# Patient Record
Sex: FEMALE | Race: WHITE | NOT HISPANIC OR LATINO | Employment: FULL TIME | URBAN - METROPOLITAN AREA
[De-identification: names, ages, dates, MRNs, and addresses within clinical notes are randomized per-mention and may not be internally consistent; named-entity substitution may affect disease eponyms.]

---

## 2017-11-24 ENCOUNTER — OFFICE VISIT (OUTPATIENT)
Dept: URGENT CARE | Facility: CLINIC | Age: 37
End: 2017-11-24

## 2017-11-24 ENCOUNTER — APPOINTMENT (OUTPATIENT)
Dept: LAB | Facility: HOSPITAL | Age: 37
End: 2017-11-24

## 2017-11-24 DIAGNOSIS — R30.9 PAINFUL MICTURITION: ICD-10-CM

## 2017-11-24 PROCEDURE — 87086 URINE CULTURE/COLONY COUNT: CPT

## 2017-11-24 PROCEDURE — 99203 OFFICE O/P NEW LOW 30 MIN: CPT

## 2017-11-24 PROCEDURE — 87077 CULTURE AEROBIC IDENTIFY: CPT

## 2017-11-24 PROCEDURE — 87186 SC STD MICRODIL/AGAR DIL: CPT

## 2017-11-26 LAB — BACTERIA UR CULT: ABNORMAL

## 2017-12-05 NOTE — PROGRESS NOTES
Assessment    1  Urinary tract infection (599 0) (N39 0)    Plan  Painful urination    · (1) URINE CULTURE; Source:Urine, Clean Catch; Status:Active - Retrospective By  Protocol Authorization; Requested for:24Nov2017;   Urinary tract infection    · Nitrofurantoin Monohyd Macro 100 MG Oral Capsule; TAKE 1 CAPSULE TWICE  DAILY UNTIL GONE    Discussion/Summary  Discussion Summary:   1) take abx as prescribed  2) may c/w otc meds  3) will send urine cx- will call if any discrepancies with treatment are found  Chief Complaint    1  Urinary Frequency  Chief Complaint Free Text Note Form: Patient relates started with pain and burning with urination x5 days  Denies fever  Denies chills  Denies flank pain  Taking UTI OCT medication unsure of name  History of Present Illness  HPI: 31yo F p/w burning upon urination and abdominal discomfort x 5 days  Pt taking OTC cranberry supplement with improvement in symptoms  Pt denies n/v/d, back pain/flank pain, fever/chills  Hospital Based Practices Required Assessment:   Abuse And Domestic Violence Screen    Yes, the patient is safe at home  The patient states no one is hurting them  Depression And Suicide Screen  No, the patient has not had thoughts of hurting themself  No, the patient has not felt depressed in the past 7 days  Prefered Language is  english  Primary Language is  english  Urinary Frequency: Daira Hernandez presents with complaints of urinary frequency  Associated symptoms include dysuria, suprapubic pain and cloudy urine, but no urinary urgency, no urinary incontinence, no nocturia, no hematuria, no fever, no chills, no low back pain, no flank pain, no nausea, no vomiting, no diarrhea, no constipation, no abdominal distention, no peripheral edema, no menstrual change, no vaginal discharge, no vaginal itching, no weight loss and no anxiety        Review of Systems  Focused-Female:   Constitutional: No fever, no chills, feels well, no tiredness, no recent weight gain or loss  ENT: no ear ache, no loss of hearing, no nosebleeds or nasal discharge, no sore throat or hoarseness  Cardiovascular: no complaints of slow or fast heart rate, no chest pain, no palpitations, no leg claudication or lower extremity edema  Respiratory: no complaints of shortness of breath, no wheezing, no dyspnea on exertion, no orthopnea or PND  Breasts: no complaints of breast pain, breast lump or nipple discharge  Gastrointestinal: no complaints of abdominal pain, no constipation, no nausea or diarrhea, no vomiting, no bloody stools  Genitourinary: dysuria and pelvic pain, but no vaginal discharge, no incontinence, no dysmenorrhea and no unexplained vaginal bleeding  Musculoskeletal: no complaints of arthralgia, no myalgia, no joint swelling or stiffness, no limb pain or swelling  Integumentary: no complaints of skin rash or lesion, no itching or dry skin, no skin wounds  Neurological: no complaints of headache, no confusion, no numbness or tingling, no dizziness or fainting  ROS Reviewed:   ROS reviewed  Past Medical History    1  No pertinent past medical history  Active Problems And Past Medical History Reviewed: The active problems and past medical history were reviewed and updated today  Family History  Mother    1  Family history of Type 2 diabetes mellitus without complication  Father    2  Family history of Type 2 diabetes mellitus without complication  Family History Reviewed: The family history was reviewed and updated today  Social History    · Never smoker  Social History Reviewed: The social history was reviewed and is unchanged  Surgical History    1  Denied: History Of Prior Surgery  Surgical History Reviewed: The surgical history was reviewed and updated today  Current Meds   1  No Reported Medications Recorded  Medication List Reviewed: The medication list was reviewed and updated today  Allergies    1   No Known Drug Allergies    Vitals  Signs   Recorded: [id number] 10:12AM   Temperature: 100 2 F, Tympanic  Heart Rate: 67  Respiration: 16  Systolic: 793, RUE, Sitting  Diastolic: 81, RUE, Sitting  Height: 5 ft 3 in  Weight: 164 lb 4 oz  BMI Calculated: 29 1  BSA Calculated: 1 78  O2 Saturation: 100, RA  Pain Scale: 0    Physical Exam    Constitutional   General appearance: No acute distress, well appearing and well nourished  Pulmonary   Respiratory effort: No increased work of breathing or signs of respiratory distress  Auscultation of lungs: Clear to auscultation  Cardiovascular   Auscultation of heart: Normal rate and rhythm, normal S1 and S2, without murmurs  Abdomen   Abdomen: Abnormal   The abdomen was flat  Bowel sounds were normal  There was mild tenderness in the suprapubic area  no masses palpated  The abdomen was normal to percussion  no CVA tenderness   Liver and spleen: No hepatomegaly or splenomegaly  Skin   Skin and subcutaneous tissue: Normal without rashes or lesions      Psychiatric   Orientation to person, place, and time: Normal     Mood and affect: Normal        Signatures   Electronically signed by : SAVANNAH Dukes; Nov 24 2017 10:37AM EST                       (Author)

## 2018-01-21 ENCOUNTER — OFFICE VISIT (OUTPATIENT)
Dept: URGENT CARE | Facility: CLINIC | Age: 38
End: 2018-01-21
Payer: COMMERCIAL

## 2018-01-21 ENCOUNTER — GENERIC CONVERSION - ENCOUNTER (OUTPATIENT)
Dept: OTHER | Facility: OTHER | Age: 38
End: 2018-01-21

## 2018-01-21 LAB
ATRIAL RATE: 65 BPM
P AXIS: -19 DEGREES
PR INTERVAL: 116 MS
QRS AXIS: 51 DEGREES
QRSD INTERVAL: 90 MS
QT INTERVAL: 412 MS
QTC INTERVAL: 428 MS
T WAVE AXIS: 30 DEGREES
VENTRICULAR RATE: 65 BPM

## 2018-01-21 PROCEDURE — 93005 ELECTROCARDIOGRAM TRACING: CPT

## 2018-01-21 PROCEDURE — G0382 LEV 3 HOSP TYPE B ED VISIT: HCPCS
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
## 2018-02-24 ENCOUNTER — OFFICE VISIT (OUTPATIENT)
Dept: URGENT CARE | Facility: CLINIC | Age: 38
End: 2018-02-24
Payer: COMMERCIAL

## 2018-02-24 VITALS
BODY MASS INDEX: 28.35 KG/M2 | TEMPERATURE: 100.1 F | WEIGHT: 160 LBS | RESPIRATION RATE: 20 BRPM | DIASTOLIC BLOOD PRESSURE: 74 MMHG | OXYGEN SATURATION: 99 % | HEART RATE: 90 BPM | SYSTOLIC BLOOD PRESSURE: 139 MMHG | HEIGHT: 63 IN

## 2018-02-24 DIAGNOSIS — M79.89 FOOT SWELLING: Primary | ICD-10-CM

## 2018-02-24 PROCEDURE — G0381 LEV 2 HOSP TYPE B ED VISIT: HCPCS | Performed by: FAMILY MEDICINE

## 2018-02-24 NOTE — PATIENT INSTRUCTIONS
Suggest wearing support stockings  Elevate your feet as much as possible throughout the day  Follow up with her family doctor in the next few days for further evaluation and lab work regarding your swollen foot and ankle area    Go to emergency room if any severe increased swelling shortness of breath or calf pain

## 2018-02-24 NOTE — PROGRESS NOTES
[address] [place] [address] [address]     Patient Information: [address]  MRN: [id number] : 1980  Encounter: [id number]    HPI:  Patient presents with complaints of left foot swelling over the last 6 days  She denies any injury  She states it is worse in the morning when she awakens  She states she did fall and hurt her knee about a month ago  She had no problems up until 6 days ago  She also has noted a headache over the last few days  She denies any nausea or vomiting  She was unaware that she had a low-grade fever  She has not had any recent lab work done in years  She denies any associated shortness of breath or chest pain  She is a nonsmoker  Subjective:   Review of Systems   Constitutional: Negative  HENT: Negative for congestion, ear discharge, ear pain, hearing loss, rhinorrhea, sinus pain, sinus pressure, sore throat, tinnitus, trouble swallowing and voice change  Eyes: Negative  Respiratory: Negative  Cardiovascular: Negative  Gastrointestinal: Negative  Genitourinary: Negative  Musculoskeletal: Negative  Left foot and ankle swelling   Skin: Negative  Neurological: Positive for headaches  Hematological: Negative  Objective:  /74   Pulse 90   Temp 100 1 °F (37 8 °C)   Resp 20   Ht 5' 3" (1 6 m)   Wt 72 6 kg (160 lb)   SpO2 99%   BMI 28 34 kg/m²   Physical Exam   Constitutional: She is oriented to person, place, and time  She appears well-developed  HENT:   Head: Normocephalic  Right Ear: External ear normal    Left Ear: External ear normal    Mouth/Throat: Oropharynx is clear and moist    Eyes: EOM are normal  Pupils are equal, round, and reactive to light  Neck: Normal range of motion  Neck supple  No thyromegaly present  Cardiovascular: Normal rate, regular rhythm, normal heart sounds and intact distal pulses      Pulmonary/Chest: Effort normal and breath sounds normal    Abdominal: Soft  Bowel sounds are normal  There is no tenderness  Musculoskeletal: Normal range of motion  Neurological: She is alert and oriented to person, place, and time  She has normal reflexes  Skin: Skin is warm and dry  Left foot-there is slight pitting edema noted to the foot and lower leg  Negative Homans sign  There is a very fine petechial type rash over both anterior tibial areas  Nonblanching  Good pedal pulses are noted  No palpable deformity  Neurovascular exam is otherwise intact  Psychiatric: She has a normal mood and affect  Vitals reviewed  Assessment:  Diagnoses and all orders for this visit:    Foot swelling        Plan:  Patient Instructions   Suggest wearing support stockings  Elevate your feet as much as possible throughout the day  Follow up with her family doctor in the next few days for further evaluation and lab work regarding your swollen foot and ankle area  Go to emergency room if any severe increased swelling shortness of breath or calf pain       Rosemarie Leal DO  2/24/2018,12:02 PM    Portions of the record may have been created with voice recognition software   Occasional wrong word or "sound a like" substitutions may have occurred due to the inherent limitations of voice recognition software   Read the chart carefully and recognize, using context, where substitutions have occurred

## 2018-03-01 ENCOUNTER — TRANSCRIBE ORDERS (OUTPATIENT)
Dept: ADMINISTRATIVE | Facility: HOSPITAL | Age: 38
End: 2018-03-01

## 2018-03-01 ENCOUNTER — APPOINTMENT (OUTPATIENT)
Dept: LAB | Facility: CLINIC | Age: 38
End: 2018-03-01

## 2018-03-01 ENCOUNTER — OFFICE VISIT (OUTPATIENT)
Dept: FAMILY MEDICINE CLINIC | Facility: CLINIC | Age: 38
End: 2018-03-01

## 2018-03-01 VITALS
HEIGHT: 63 IN | DIASTOLIC BLOOD PRESSURE: 64 MMHG | WEIGHT: 162.8 LBS | SYSTOLIC BLOOD PRESSURE: 110 MMHG | BODY MASS INDEX: 28.84 KG/M2

## 2018-03-01 DIAGNOSIS — M25.40 JOINT SWELLING: ICD-10-CM

## 2018-03-01 DIAGNOSIS — R21 RASH: ICD-10-CM

## 2018-03-01 DIAGNOSIS — R53.83 FATIGUE, UNSPECIFIED TYPE: Primary | ICD-10-CM

## 2018-03-01 DIAGNOSIS — M25.475 SWELLING OF FOOT JOINT, LEFT: ICD-10-CM

## 2018-03-01 DIAGNOSIS — R53.83 FATIGUE, UNSPECIFIED TYPE: ICD-10-CM

## 2018-03-01 LAB
ALBUMIN SERPL BCP-MCNC: 3.5 G/DL (ref 3.5–5)
ALP SERPL-CCNC: 115 U/L (ref 46–116)
ALT SERPL W P-5'-P-CCNC: 82 U/L (ref 12–78)
ANION GAP SERPL CALCULATED.3IONS-SCNC: 6 MMOL/L (ref 4–13)
AST SERPL W P-5'-P-CCNC: 42 U/L (ref 5–45)
BASOPHILS # BLD AUTO: 0.06 THOUSANDS/ΜL (ref 0–0.1)
BASOPHILS NFR BLD AUTO: 1 % (ref 0–1)
BILIRUB SERPL-MCNC: 0.59 MG/DL (ref 0.2–1)
BUN SERPL-MCNC: 12 MG/DL (ref 5–25)
CALCIUM SERPL-MCNC: 8.6 MG/DL (ref 8.3–10.1)
CHLORIDE SERPL-SCNC: 108 MMOL/L (ref 100–108)
CO2 SERPL-SCNC: 26 MMOL/L (ref 21–32)
CREAT SERPL-MCNC: 0.82 MG/DL (ref 0.6–1.3)
EOSINOPHIL # BLD AUTO: 0.18 THOUSAND/ΜL (ref 0–0.61)
EOSINOPHIL NFR BLD AUTO: 4 % (ref 0–6)
ERYTHROCYTE [DISTWIDTH] IN BLOOD BY AUTOMATED COUNT: 13.8 % (ref 11.6–15.1)
GFR SERPL CREATININE-BSD FRML MDRD: 93 ML/MIN/1.73SQ M
GLUCOSE P FAST SERPL-MCNC: 86 MG/DL (ref 65–99)
HCT VFR BLD AUTO: 38 % (ref 34.8–46.1)
HGB BLD-MCNC: 12.1 G/DL (ref 11.5–15.4)
LYMPHOCYTES # BLD AUTO: 2.97 THOUSANDS/ΜL (ref 0.6–4.47)
LYMPHOCYTES NFR BLD AUTO: 60 % (ref 14–44)
MCH RBC QN AUTO: 26.8 PG (ref 26.8–34.3)
MCHC RBC AUTO-ENTMCNC: 31.8 G/DL (ref 31.4–37.4)
MCV RBC AUTO: 84 FL (ref 82–98)
MONOCYTES # BLD AUTO: 0.5 THOUSAND/ΜL (ref 0.17–1.22)
MONOCYTES NFR BLD AUTO: 10 % (ref 4–12)
NEUTROPHILS # BLD AUTO: 1.21 THOUSANDS/ΜL (ref 1.85–7.62)
NEUTS SEG NFR BLD AUTO: 25 % (ref 43–75)
NRBC BLD AUTO-RTO: 0 /100 WBCS
PLATELET # BLD AUTO: 242 THOUSANDS/UL (ref 149–390)
PMV BLD AUTO: 8.8 FL (ref 8.9–12.7)
POTASSIUM SERPL-SCNC: 4.1 MMOL/L (ref 3.5–5.3)
PROT SERPL-MCNC: 7.5 G/DL (ref 6.4–8.2)
RBC # BLD AUTO: 4.51 MILLION/UL (ref 3.81–5.12)
SODIUM SERPL-SCNC: 140 MMOL/L (ref 136–145)
WBC # BLD AUTO: 4.92 THOUSAND/UL (ref 4.31–10.16)

## 2018-03-01 PROCEDURE — 85025 COMPLETE CBC W/AUTO DIFF WBC: CPT

## 2018-03-01 PROCEDURE — 36415 COLL VENOUS BLD VENIPUNCTURE: CPT

## 2018-03-01 PROCEDURE — 80053 COMPREHEN METABOLIC PANEL: CPT

## 2018-03-01 PROCEDURE — 99203 OFFICE O/P NEW LOW 30 MIN: CPT | Performed by: NURSE PRACTITIONER

## 2018-03-01 RX ORDER — ALBUTEROL SULFATE 90 UG/1
AEROSOL, METERED RESPIRATORY (INHALATION)
COMMUNITY
Start: 2016-05-28 | End: 2018-10-29
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
## 2018-03-01 NOTE — LETTER
March 1, 2018     Patient: Althea Sesay   YOB: 1980   Date of Visit: 3/1/2018       To Whom it May Concern:    Althea Sesay is under my professional care  She was seen in my office on 3/1/2018  If you have any questions or concerns, please don't hesitate to call           Sincerely,          Priscilla Hard        CC: No Recipients

## 2018-03-02 ENCOUNTER — DOCUMENTATION (OUTPATIENT)
Dept: FAMILY MEDICINE CLINIC | Facility: CLINIC | Age: 38
End: 2018-03-02

## 2018-03-02 NOTE — PROGRESS NOTES
Blood work results received - no signs of infection, CMP within normal range also  Spoke to Dr Eveline Dunbar regarding symptoms, possible allergic exposure or inflammation  Will have her continue with hydrocortisone cream BID for the next two weeks to try and diminish the sx

## 2018-10-15 ENCOUNTER — OFFICE VISIT (OUTPATIENT)
Dept: URGENT CARE | Facility: CLINIC | Age: 38
End: 2018-10-15
Payer: COMMERCIAL

## 2018-10-15 VITALS
HEIGHT: 63 IN | OXYGEN SATURATION: 97 % | BODY MASS INDEX: 28.7 KG/M2 | WEIGHT: 162 LBS | TEMPERATURE: 99 F | RESPIRATION RATE: 18 BRPM | DIASTOLIC BLOOD PRESSURE: 77 MMHG | SYSTOLIC BLOOD PRESSURE: 117 MMHG | HEART RATE: 80 BPM

## 2018-10-15 DIAGNOSIS — N39.0 URINARY TRACT INFECTION WITHOUT HEMATURIA, SITE UNSPECIFIED: Primary | ICD-10-CM

## 2018-10-15 PROCEDURE — S9083 URGENT CARE CENTER GLOBAL: HCPCS | Performed by: PHYSICIAN ASSISTANT

## 2018-10-15 PROCEDURE — G0382 LEV 3 HOSP TYPE B ED VISIT: HCPCS | Performed by: PHYSICIAN ASSISTANT

## 2018-10-15 PROCEDURE — 87086 URINE CULTURE/COLONY COUNT: CPT | Performed by: PHYSICIAN ASSISTANT

## 2018-10-15 RX ORDER — NITROFURANTOIN 25; 75 MG/1; MG/1
100 CAPSULE ORAL 2 TIMES DAILY
Qty: 14 CAPSULE | Refills: 0 | Status: SHIPPED | OUTPATIENT
Start: 2018-10-15 | End: 2018-10-22

## 2018-10-15 NOTE — PROGRESS NOTES
[facility]        NAME: Carl Reagan is a 30 y o  female  : 1980    MRN: [id number]  DATE: October 15, 2018  TIME: 5:21 PM    Assessment and Plan   Urinary tract infection without hematuria, site unspecified [N39 0]  1  Urinary tract infection without hematuria, site unspecified  Urine culture    nitrofurantoin (MACROBID) 100 mg capsule    CANCELED: Urine culture         Patient Instructions       Follow up with PCP in 3-5 days  Proceed to  ER if symptoms worsen  Chief Complaint     Chief Complaint   Patient presents with    Possible UTI     urgency and burning since Thursday         History of Present Illness       Urinary Tract Infection    This is a new problem  Episode onset: 5 days ago  The problem occurs every urination  The problem has been gradually worsening  The pain is moderate  There has been no fever  There is no history of pyelonephritis  Associated symptoms include frequency and urgency  Pertinent negatives include no chills, discharge, flank pain, hematuria, hesitancy, nausea, possible pregnancy, sweats or vomiting  She has tried nothing for the symptoms  The treatment provided moderate relief  There is no history of catheterization, kidney stones, recurrent UTIs, a single kidney, urinary stasis or a urological procedure  Review of Systems   Review of Systems   Constitutional: Negative for chills  Gastrointestinal: Negative for nausea and vomiting  Genitourinary: Positive for dysuria, frequency and urgency  Negative for difficulty urinating, dyspareunia, enuresis, flank pain, hematuria and hesitancy           Current Medications       Current Outpatient Prescriptions:     albuterol (PROVENTIL HFA,VENTOLIN HFA) 90 mcg/act inhaler, 2 puffs po q 4-6 hours prn coughing, wheezing or shortness of breath, Disp: , Rfl:     nitrofurantoin (MACROBID) 100 mg capsule, Take 1 capsule (100 mg total) by mouth 2 (two) times a day for 7 days, Disp: 14 capsule, Rfl: 0    Current Allergies     Allergies as of 10/15/2018    (No Known Allergies)            The following portions of the patient's history were reviewed and updated as appropriate: allergies, current medications, past family history, past medical history, past social history, past surgical history and problem list      History reviewed  No pertinent past medical history  History reviewed  No pertinent surgical history  Family History   Problem Relation Age of Onset    Diabetes type II Mother         without complication     Diabetes type II Father         without complication          Medications have been verified  Objective   /77   Pulse 80   Temp 99 °F (37 2 °C) (Tympanic)   Resp 18   Ht 5' 3" (1 6 m)   Wt 73 5 kg (162 lb)   SpO2 97%   BMI 28 70 kg/m²        Physical Exam     Physical Exam   Constitutional: She appears well-developed and well-nourished  Cardiovascular: Normal rate, regular rhythm, normal heart sounds and intact distal pulses  Exam reveals no gallop and no friction rub  No murmur heard  Pulmonary/Chest: Effort normal and breath sounds normal  No respiratory distress  She has no wheezes  She has no rales  Abdominal: Soft  Bowel sounds are normal  She exhibits no distension  There is tenderness in the suprapubic area  There is no rebound, no guarding and no CVA tenderness

## 2018-10-15 NOTE — PATIENT INSTRUCTIONS

## 2018-10-16 LAB — BACTERIA UR CULT: ABNORMAL

## 2018-10-22 NOTE — PROGRESS NOTES
Still taking Macrobid  Per Silvia patient can stop meds  She is feeling better   Will call back if symptoms come back

## 2018-10-22 NOTE — PROGRESS NOTES
Can we please call Mamta and ask how she is feeling since taking the antibiotic? I know they prescribed Macrobid when she was seen at Urgent Care  Thanks

## 2018-10-29 ENCOUNTER — OFFICE VISIT (OUTPATIENT)
Dept: FAMILY MEDICINE CLINIC | Facility: CLINIC | Age: 38
End: 2018-10-29
Payer: COMMERCIAL

## 2018-10-29 VITALS
HEART RATE: 88 BPM | WEIGHT: 157 LBS | OXYGEN SATURATION: 98 % | DIASTOLIC BLOOD PRESSURE: 70 MMHG | BODY MASS INDEX: 27.82 KG/M2 | SYSTOLIC BLOOD PRESSURE: 116 MMHG | HEIGHT: 63 IN

## 2018-10-29 DIAGNOSIS — R53.83 FATIGUE, UNSPECIFIED TYPE: ICD-10-CM

## 2018-10-29 DIAGNOSIS — F41.9 ANXIETY: ICD-10-CM

## 2018-10-29 DIAGNOSIS — Z23 NEEDS FLU SHOT: Primary | ICD-10-CM

## 2018-10-29 PROBLEM — M25.40 JOINT SWELLING: Status: RESOLVED | Noted: 2018-03-01 | Resolved: 2018-10-29

## 2018-10-29 PROBLEM — R21 RASH: Status: RESOLVED | Noted: 2018-03-01 | Resolved: 2018-10-29

## 2018-10-29 PROBLEM — M25.475 SWELLING OF FOOT JOINT, LEFT: Status: RESOLVED | Noted: 2018-03-01 | Resolved: 2018-10-29

## 2018-10-29 PROCEDURE — 99395 PREV VISIT EST AGE 18-39: CPT | Performed by: INTERNAL MEDICINE

## 2018-10-29 PROCEDURE — 99213 OFFICE O/P EST LOW 20 MIN: CPT | Performed by: INTERNAL MEDICINE

## 2018-10-29 PROCEDURE — 3008F BODY MASS INDEX DOCD: CPT | Performed by: INTERNAL MEDICINE

## 2018-10-29 RX ORDER — SERTRALINE HYDROCHLORIDE 25 MG/1
25 TABLET, FILM COATED ORAL DAILY
Qty: 30 TABLET | Refills: 3 | Status: SHIPPED | OUTPATIENT
Start: 2018-10-29 | End: 2018-12-11 | Stop reason: SDUPTHER
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
## 2018-10-29 NOTE — ASSESSMENT & PLAN NOTE
Mild   Will give a trial of sertraline 25 mg daily  We discussed the possibility of GI distress, sexual dysfunction, and slight weight gain on this medication  This will be more likely on higher doses  Since she does have some obsessive symptoms, this may also this as well  We will reassess in 6 weeks

## 2018-10-29 NOTE — PATIENT INSTRUCTIONS
Anxiety  Mild  Will give a trial of sertraline 25 mg daily  Fatigue  Will check CBC, iron panel, and thyroid function

## 2018-11-01 ENCOUNTER — TRANSCRIBE ORDERS (OUTPATIENT)
Dept: ADMINISTRATIVE | Facility: HOSPITAL | Age: 38
End: 2018-11-01

## 2018-11-01 ENCOUNTER — APPOINTMENT (OUTPATIENT)
Dept: LAB | Facility: CLINIC | Age: 38
End: 2018-11-01
Payer: COMMERCIAL

## 2018-11-01 DIAGNOSIS — R53.83 FATIGUE, UNSPECIFIED TYPE: ICD-10-CM

## 2018-11-01 LAB
ANION GAP SERPL CALCULATED.3IONS-SCNC: 7 MMOL/L (ref 4–13)
BUN SERPL-MCNC: 9 MG/DL (ref 5–25)
CALCIUM SERPL-MCNC: 8.7 MG/DL (ref 8.3–10.1)
CHLORIDE SERPL-SCNC: 107 MMOL/L (ref 100–108)
CO2 SERPL-SCNC: 23 MMOL/L (ref 21–32)
CREAT SERPL-MCNC: 0.75 MG/DL (ref 0.6–1.3)
ERYTHROCYTE [DISTWIDTH] IN BLOOD BY AUTOMATED COUNT: 12.9 % (ref 11.6–15.1)
FERRITIN SERPL-MCNC: 7 NG/ML (ref 8–388)
GFR SERPL CREATININE-BSD FRML MDRD: 103 ML/MIN/1.73SQ M
GLUCOSE P FAST SERPL-MCNC: 84 MG/DL (ref 65–99)
HCT VFR BLD AUTO: 38.7 % (ref 34.8–46.1)
HGB BLD-MCNC: 12.3 G/DL (ref 11.5–15.4)
IRON SATN MFR SERPL: 19 %
IRON SERPL-MCNC: 80 UG/DL (ref 50–170)
MCH RBC QN AUTO: 28.4 PG (ref 26.8–34.3)
MCHC RBC AUTO-ENTMCNC: 31.8 G/DL (ref 31.4–37.4)
MCV RBC AUTO: 89 FL (ref 82–98)
PLATELET # BLD AUTO: 232 THOUSANDS/UL (ref 149–390)
PMV BLD AUTO: 10.1 FL (ref 8.9–12.7)
POTASSIUM SERPL-SCNC: 3.8 MMOL/L (ref 3.5–5.3)
RBC # BLD AUTO: 4.33 MILLION/UL (ref 3.81–5.12)
SODIUM SERPL-SCNC: 137 MMOL/L (ref 136–145)
TIBC SERPL-MCNC: 421 UG/DL (ref 250–450)
TSH SERPL DL<=0.05 MIU/L-ACNC: 0.99 UIU/ML (ref 0.36–3.74)
WBC # BLD AUTO: 3.96 THOUSAND/UL (ref 4.31–10.16)

## 2018-11-01 PROCEDURE — 82728 ASSAY OF FERRITIN: CPT

## 2018-11-01 PROCEDURE — 36415 COLL VENOUS BLD VENIPUNCTURE: CPT

## 2018-11-01 PROCEDURE — 84443 ASSAY THYROID STIM HORMONE: CPT

## 2018-11-01 PROCEDURE — 85027 COMPLETE CBC AUTOMATED: CPT

## 2018-11-01 PROCEDURE — 80048 BASIC METABOLIC PNL TOTAL CA: CPT

## 2018-11-01 PROCEDURE — 83550 IRON BINDING TEST: CPT

## 2018-11-01 PROCEDURE — 83540 ASSAY OF IRON: CPT

## 2018-11-02 ENCOUNTER — TELEPHONE (OUTPATIENT)
Dept: FAMILY MEDICINE CLINIC | Facility: CLINIC | Age: 38
End: 2018-11-02

## 2018-11-02 DIAGNOSIS — E61.1 DIETARY IRON DEFICIENCY WITHOUT ANEMIA: Primary | ICD-10-CM

## 2018-11-02 RX ORDER — FERROUS SULFATE 325(65) MG
325 TABLET ORAL EVERY OTHER DAY
Refills: 0 | COMMUNITY
Start: 2018-11-02 | End: 2019-12-19

## 2018-11-02 NOTE — TELEPHONE ENCOUNTER
I called and left her a generic voicemail about calling back to get her lab results or signing up for [employer] so she conceived the labs on her own  The only result of significance is that her ferritin (iron level) is very low  I would recommend that she start on over-the-counter her iron in the form of ferrous sulfate 65 mg 1 tablet every other day  Note, the white blood cell count of 3 96 is not significant  Appointment on 11/01/2018   Component Date Value Ref Range Status    WBC 11/01/2018 3 96* 4 31 - 10 16 Thousand/uL Final    RBC 11/01/2018 4 33  3 81 - 5 12 Million/uL Final    Hemoglobin 11/01/2018 12 3  11 5 - 15 4 g/dL Final    Hematocrit 11/01/2018 38 7  34 8 - 46 1 % Final    MCV 11/01/2018 89  82 - 98 fL Final    MCH 11/01/2018 28 4  26 8 - 34 3 pg Final    MCHC 11/01/2018 31 8  31 4 - 37 4 g/dL Final    RDW 11/01/2018 12 9  11 6 - 15 1 % Final    Platelets 63/66/1190 232  149 - 390 Thousands/uL Final    MPV 11/01/2018 10 1  8 9 - 12 7 fL Final    Sodium 11/01/2018 137  136 - 145 mmol/L Final    Potassium 11/01/2018 3 8  3 5 - 5 3 mmol/L Final    Chloride 11/01/2018 107  100 - 108 mmol/L Final    CO2 11/01/2018 23  21 - 32 mmol/L Final    ANION GAP 11/01/2018 7  4 - 13 mmol/L Final    BUN 11/01/2018 9  5 - 25 mg/dL Final    Creatinine 11/01/2018 0 75  0 60 - 1 30 mg/dL Final    Standardized to IDMS reference method    Glucose, Fasting 11/01/2018 84  65 - 99 mg/dL Final      Specimen collection should occur prior to Sulfasalazine administration due to the potential for falsely depressed results  Specimen collection should occur prior to Sulfapyridine administration due to the potential for falsely elevated results      Calcium 11/01/2018 8 7  8 3 - 10 1 mg/dL Final    eGFR 11/01/2018 103  ml/min/1 73sq m Final    TSH 3RD GENERATON 11/01/2018 0 992  0 358 - 3 740 uIU/mL Final    Using supplements with high doses of biotin 20 to more than 300 times greater than the adequate daily intake for adults of 30 mcg/day as established by the [place] [employer] [facility], can cause falsely depress results   Iron Saturation 11/01/2018 19  % Final    TIBC 11/01/2018 421  250 - 450 ug/dL Final    Iron 11/01/2018 80  50 - 170 ug/dL Final      Patients treated with metal-binding drugs (ie  Deferoxamine) may have depressed iron values      Ferritin 11/01/2018 7* 8 - 388 ng/mL Final

## 2018-12-11 ENCOUNTER — OFFICE VISIT (OUTPATIENT)
Dept: FAMILY MEDICINE CLINIC | Facility: CLINIC | Age: 38
End: 2018-12-11
Payer: COMMERCIAL

## 2018-12-11 VITALS
OXYGEN SATURATION: 99 % | HEART RATE: 63 BPM | HEIGHT: 63 IN | SYSTOLIC BLOOD PRESSURE: 120 MMHG | WEIGHT: 152.8 LBS | BODY MASS INDEX: 27.07 KG/M2 | DIASTOLIC BLOOD PRESSURE: 70 MMHG

## 2018-12-11 DIAGNOSIS — Z01.419 ENCOUNTER FOR CERVICAL PAP SMEAR WITH PELVIC EXAM: Primary | ICD-10-CM

## 2018-12-11 DIAGNOSIS — F41.9 ANXIETY: ICD-10-CM

## 2018-12-11 DIAGNOSIS — F42.9 OBSESSIVE-COMPULSIVE DISORDER, UNSPECIFIED TYPE: ICD-10-CM

## 2018-12-11 PROCEDURE — 1036F TOBACCO NON-USER: CPT | Performed by: INTERNAL MEDICINE

## 2018-12-11 PROCEDURE — 99213 OFFICE O/P EST LOW 20 MIN: CPT | Performed by: INTERNAL MEDICINE

## 2018-12-11 PROCEDURE — 3008F BODY MASS INDEX DOCD: CPT | Performed by: INTERNAL MEDICINE

## 2018-12-11 RX ORDER — SERTRALINE HYDROCHLORIDE 25 MG/1
25 TABLET, FILM COATED ORAL EVERY OTHER DAY
Qty: 30 TABLET | Refills: 0 | COMMUNITY
Start: 2018-12-11 | End: 2019-06-25

## 2018-12-11 RX ORDER — BUSPIRONE HYDROCHLORIDE 7.5 MG/1
7.5 TABLET ORAL 3 TIMES DAILY
Qty: 60 TABLET | Refills: 5 | Status: SHIPPED | OUTPATIENT
Start: 2018-12-11 | End: 2019-06-25

## 2018-12-11 NOTE — ASSESSMENT & PLAN NOTE
Because of side effects, we will taper off sertraline  Take 25 mg every other day for 1 week and then discontinue  Will start buspirone (also known as BuSpar) 7 5 mg twice a day

## 2018-12-11 NOTE — LETTER
December 11, 2018     Patient: Puja Summers   YOB: 1980   Date of Visit: 12/11/2018       To Whom it May Concern:    Puja Summers is under my professional care  She was seen in my office on 12/11/2018  She may return to work on 12/12/2018  If you have any questions or concerns, please don't hesitate to call           Sincerely,          Merritt Richardosn MD        CC: No Recipients

## 2018-12-11 NOTE — PROGRESS NOTES
Assessment/Plan:    Problem List Items Addressed This Visit        Other    Anxiety     Because of side effects, we will taper off sertraline  Take 25 mg every other day for 1 week and then discontinue  Will start buspirone (also known as BuSpar) 7 5 mg twice a day  Relevant Medications    sertraline (ZOLOFT) 25 mg tablet    busPIRone (BUSPAR) 7 5 mg tablet    OCD (obsessive compulsive disorder)     SSRIs are the drugs that have been most effective for OCD  However, they all tend to produce some degree of sexual dysfunction  We discussed the possibility of referral for CBT  She wants to hold off on this for now  Relevant Medications    sertraline (ZOLOFT) 25 mg tablet    busPIRone (BUSPAR) 7 5 mg tablet      Other Visit Diagnoses     Encounter for cervical Pap smear with pelvic exam    -  Primary    Relevant Orders    Ambulatory referral to Gynecology          Subjective:     Patient ID: Dayanara Rodrigues is a 41 y o  female  Here for follow-up after starting on sertraline 25 mg daily for anxiety and OCD  She feels that sertraline has really helped her anxiety  She usually has difficulty getting anxious on Sunday nights before she has to go back to work Monday morning  Also, extra work with 10 to stress her out as well  Both of these things have actually improved  However, she has been having some difficulty with decreased libido and delayed orgasm  There has been no improvement in her OCD at this dose  Objective:    /70 (BP Location: Left arm, Patient Position: Sitting, Cuff Size: Standard)   Pulse 63   Ht 5' 3" (1 6 m)   Wt 69 3 kg (152 lb 12 8 oz)   SpO2 99%   Breastfeeding? No   BMI 27 07 kg/m²       Physical Exam    General:  Well-developed, well-nourished, in no acute distress  Exam otherwise deferred today

## 2018-12-11 NOTE — PATIENT INSTRUCTIONS
Anxiety  Because of side effects, we will taper off sertraline  Take 25 mg every other day for 1 week and then discontinue  Will start buspirone (also known as BuSpar) 7 5 mg twice a day

## 2018-12-11 NOTE — ASSESSMENT & PLAN NOTE
SSRIs are the drugs that have been most effective for OCD  However, they all tend to produce some degree of sexual dysfunction  We discussed the possibility of referral for CBT  She wants to hold off on this for now

## 2019-06-25 ENCOUNTER — OFFICE VISIT (OUTPATIENT)
Dept: FAMILY MEDICINE CLINIC | Facility: CLINIC | Age: 39
End: 2019-06-25
Payer: COMMERCIAL

## 2019-06-25 VITALS
OXYGEN SATURATION: 99 % | WEIGHT: 164.2 LBS | DIASTOLIC BLOOD PRESSURE: 70 MMHG | HEIGHT: 63 IN | HEART RATE: 66 BPM | SYSTOLIC BLOOD PRESSURE: 116 MMHG | BODY MASS INDEX: 29.09 KG/M2

## 2019-06-25 DIAGNOSIS — L30.9 ECZEMA OF LEFT HAND: Primary | ICD-10-CM

## 2019-06-25 DIAGNOSIS — Z12.4 PAP SMEAR FOR CERVICAL CANCER SCREENING: ICD-10-CM

## 2019-06-25 PROCEDURE — 3008F BODY MASS INDEX DOCD: CPT | Performed by: INTERNAL MEDICINE

## 2019-06-25 PROCEDURE — 99213 OFFICE O/P EST LOW 20 MIN: CPT | Performed by: INTERNAL MEDICINE

## 2019-06-25 PROCEDURE — 1036F TOBACCO NON-USER: CPT | Performed by: INTERNAL MEDICINE

## 2019-06-25 RX ORDER — BETAMETHASONE DIPROPIONATE 0.5 MG/G
CREAM TOPICAL 2 TIMES DAILY
Qty: 30 G | Refills: 1 | Status: SHIPPED | OUTPATIENT
Start: 2019-06-25 | End: 2019-12-19

## 2019-07-31 ENCOUNTER — OFFICE VISIT (OUTPATIENT)
Dept: OBGYN CLINIC | Facility: MEDICAL CENTER | Age: 39
End: 2019-07-31
Payer: COMMERCIAL

## 2019-07-31 VITALS
SYSTOLIC BLOOD PRESSURE: 100 MMHG | HEIGHT: 62 IN | DIASTOLIC BLOOD PRESSURE: 70 MMHG | WEIGHT: 167 LBS | BODY MASS INDEX: 30.73 KG/M2

## 2019-07-31 DIAGNOSIS — Z01.419 ENCOUNTER FOR WELL WOMAN EXAM WITH ROUTINE GYNECOLOGICAL EXAM: Primary | ICD-10-CM

## 2019-07-31 DIAGNOSIS — N63.10 LUMP OF RIGHT BREAST: ICD-10-CM

## 2019-07-31 PROCEDURE — S0610 ANNUAL GYNECOLOGICAL EXAMINA: HCPCS | Performed by: OBSTETRICS & GYNECOLOGY

## 2019-07-31 NOTE — PROGRESS NOTES
ASSESSMENT & PLAN: Nilton Milian is a 41 y o  No obstetric history on file  with normal gynecologic exam     1   Routine well woman exam done today  2  Pap and HPV:  The patient's last pap and hpv was unsure  It was normal     Pap and cotesting was done today  Current ASCCP Guidelines reviewed  3   The following were reviewed in today's visit: breast self exam   4  Right breast lump - check ultrasound and mammo    CC:  Annual Gynecologic Examination    HPI: Nilton Milian is a 41 y o  No obstetric history on file  who presents for annual gynecologic examination  She has the following concerns:  No GYN complaints; recently ; states cycles are regular  Menarche age 10    Health Maintenance:    She wears her seatbelt routinely  She does not perform regular monthly self breast exams  She feels safe at home  History reviewed  No pertinent past medical history  History reviewed  No pertinent surgical history  Past OB/Gyn History:  OB History    None       Pt does not have menstrual issues  History of sexually transmitted infection: No   History of abnormal pap smears: No      Patient is currently sexually active  heterosexual   The current method of family planning is none      Family History   Problem Relation Age of Onset    Diabetes type II Mother         without complication     Diabetes type II Father         without complication     Breast cancer Paternal Grandmother        Social History:  Social History     Socioeconomic History    Marital status: /Civil Union     Spouse name: Not on file    Number of children: Not on file    Years of education: Not on file    Highest education level: Not on file   Occupational History    Not on file   Social Needs    Financial resource strain: Not on file    Food insecurity:     Worry: Not on file     Inability: Not on file    Transportation needs:     Medical: Not on file     Non-medical: Not on file   Tobacco Use    Smoking status: Never Smoker    Smokeless tobacco: Never Used   Substance and Sexual Activity    Alcohol use: Yes     Frequency: Monthly or less     Drinks per session: 1 or 2     Binge frequency: Never    Drug use: Never    Sexual activity: Yes     Partners: Male     Birth control/protection: None   Lifestyle    Physical activity:     Days per week: Not on file     Minutes per session: Not on file    Stress: Not on file   Relationships    Social connections:     Talks on phone: Not on file     Gets together: Not on file     Attends Restorationism service: Not on file     Active member of club or organization: Not on file     Attends meetings of clubs or organizations: Not on file     Relationship status: Not on file    Intimate partner violence:     Fear of current or ex partner: Not on file     Emotionally abused: Not on file     Physically abused: Not on file     Forced sexual activity: Not on file   Other Topics Concern    Not on file   Social History Narrative    Not on file     Presently lives with   Patient is   Patient is currently employed [employer]    No Known Allergies      Current Outpatient Medications:     betamethasone dipropionate (DIPROSONE) 0 05 % cream, Apply topically 2 (two) times a day, Disp: 30 g, Rfl: 1    ferrous sulfate 325 (65 Fe) mg tablet, Take 1 tablet (325 mg total) by mouth every other day (Patient not taking: Reported on 6/25/2019), Disp: , Rfl: 0      Review of Systems  Constitutional :no fever, feels well, no tiredness, no recent weight gain or loss  ENT: no ear ache, no loss of hearing, no nosebleeds or nasal discharge, no sore throat or hoarseness  Cardiovascular: no complaints of slow or fast heart beat, no chest pain, no palpitations, no leg claudication or lower extremity edema    Respiratory: no complaints of shortness of shortness of breath, no CLEMENT  Breasts:no complaints of breast pain, breast lump, or nipple discharge  Gastrointestinal: no complaints of abdominal pain, constipation, nausea, vomiting, or diarrhea or bloody stools  Genitourinary : no complaints of dysuria, incontinence, pelvic pain, no dysmenorrhea, vaginal discharge or abnormal vaginal bleeding and as noted in HPI  Musculoskeletal: no complaints of arthralgia, no myalgia, no joint swelling or stiffness, no limb pain or swelling  Integumentary: no complaints of skin rash or lesion, itching or dry skin  Neurological: no complaints of headache, no confusion, no numbness or tingling, no dizziness or fainting    Objective      /70   Ht 5' 2" (1 575 m)   Wt 75 8 kg (167 lb)   LMP  (LMP Unknown)   Breastfeeding? No   BMI 30 54 kg/m²   General:   appears stated age, cooperative, alert normal mood and affect   Neck: normal, supple,trachea midline, no masses   Heart: regular rate and rhythm, S1, S2 normal, no murmur, click, rub or gallop   Lungs: clear to auscultation bilaterally   Breasts: normal appearance, no masses or tenderness, Inspection negative, No nipple retraction or dimpling, No nipple discharge or bleeding, abnormal findings: 2 cm, smooth, mobile and non-tender nodule located on the right 12:00   Abdomen: soft, non-tender, without masses or organomegaly   Vulva: normal, normal female genitalia, Bartholin's, Urethra, Okay normal, no lesions, normal female hair distribution   Vagina: normal vagina, no discharge, exudate, lesion, or erythema   Urethra: normal   Cervix: Normal, no discharge  PAP done  Uterus: normal size, contour, position, consistency, mobility, non-tender   Adnexa: normal adnexa and no mass, fullness, tenderness   Lymphatic palpation of lymph nodes in neck, axilla, groin and/or other locations: no lymphadenopathy or masses noted   Skin normal skin turgor and no rashes     Psychiatric orientation to person, place, and time: normal  mood and affect: normal

## 2019-07-31 NOTE — PATIENT INSTRUCTIONS
Thank you for your confidence in our team    We appreciate you and welcome your feedback  If you receive a survey from us, please take a few moments to let us know how we are doing     Sincerely,   Indigo Uriarte MD

## 2019-07-31 NOTE — LETTER
July 31, 2019     Patient: Carmita Caruso   YOB: 1980   Date of Visit: 7/31/2019       To Whom it May Concern:    Carmita Caruso is under my professional care  She was seen in my office on 7/31/2019  She may return to work on 8/1/19  If you have any questions or concerns, please don't hesitate to call           Sincerely,          Chantel Centeno MD

## 2019-08-07 LAB
CLINICAL INFO: NORMAL
CYTO CVX: NORMAL
CYTOLOGY CMNT CVX/VAG CYTO-IMP: NORMAL
DATE PREVIOUS BX: NORMAL
HPV E6+E7 MRNA CVX QL NAA+PROBE: NOT DETECTED
LMP START DATE: NORMAL
SL AMB PREV. PAP:: NORMAL
SPECIMEN SOURCE CVX/VAG CYTO: NORMAL

## 2019-08-09 ENCOUNTER — HOSPITAL ENCOUNTER (OUTPATIENT)
Dept: ULTRASOUND IMAGING | Facility: CLINIC | Age: 39
Discharge: HOME | End: 2019-08-09
Payer: COMMERCIAL

## 2019-08-09 ENCOUNTER — HOSPITAL ENCOUNTER (OUTPATIENT)
Dept: MAMMOGRAPHY | Facility: CLINIC | Age: 39
Discharge: HOME | End: 2019-08-09
Payer: COMMERCIAL

## 2019-08-09 VITALS — HEIGHT: 62 IN | WEIGHT: 167 LBS | BODY MASS INDEX: 30.73 KG/M2

## 2019-08-09 DIAGNOSIS — N63.10 LUMP OF RIGHT BREAST: ICD-10-CM

## 2019-08-09 DIAGNOSIS — R92.8 ABNORMAL MAMMOGRAM: ICD-10-CM

## 2019-08-09 PROCEDURE — 76642 ULTRASOUND BREAST LIMITED: CPT

## 2019-08-09 PROCEDURE — 77066 DX MAMMO INCL CAD BI: CPT

## 2019-08-09 PROCEDURE — G0279 TOMOSYNTHESIS, MAMMO: HCPCS

## 2019-11-27 DIAGNOSIS — L30.9 ECZEMA OF LEFT HAND: ICD-10-CM

## 2019-11-29 RX ORDER — BETAMETHASONE DIPROPIONATE 0.5 MG/G
CREAM TOPICAL
Qty: 30 G | Refills: 1 | OUTPATIENT
Start: 2019-11-29

## 2019-12-02 ENCOUNTER — OFFICE VISIT (OUTPATIENT)
Dept: FAMILY MEDICINE CLINIC | Facility: CLINIC | Age: 39
End: 2019-12-02
Payer: COMMERCIAL

## 2019-12-02 VITALS
DIASTOLIC BLOOD PRESSURE: 72 MMHG | WEIGHT: 176.15 LBS | HEART RATE: 70 BPM | SYSTOLIC BLOOD PRESSURE: 109 MMHG | OXYGEN SATURATION: 98 % | HEIGHT: 62 IN | BODY MASS INDEX: 32.42 KG/M2

## 2019-12-02 DIAGNOSIS — N30.00 ACUTE CYSTITIS WITHOUT HEMATURIA: Primary | ICD-10-CM

## 2019-12-02 LAB
BACTERIA UR QL AUTO: ABNORMAL /HPF
BILIRUB UR QL STRIP: NEGATIVE
CAOX CRY URNS QL MICRO: ABNORMAL /HPF
CLARITY UR: ABNORMAL
COLOR UR: YELLOW
GLUCOSE UR STRIP-MCNC: NEGATIVE MG/DL
HGB UR QL STRIP.AUTO: ABNORMAL
KETONES UR STRIP-MCNC: NEGATIVE MG/DL
LEUKOCYTE ESTERASE UR QL STRIP: ABNORMAL
NITRITE UR QL STRIP: NEGATIVE
NON-SQ EPI CELLS URNS QL MICRO: ABNORMAL /HPF
PH UR STRIP.AUTO: 5.5 [PH]
PROT UR STRIP-MCNC: NEGATIVE MG/DL
RBC #/AREA URNS AUTO: ABNORMAL /HPF
SL AMB  POCT GLUCOSE, UA: NORMAL
SL AMB LEUKOCYTE ESTERASE,UA: ABNORMAL
SL AMB POCT BILIRUBIN,UA: NORMAL
SL AMB POCT BLOOD,UA: ABNORMAL
SL AMB POCT CLARITY,UA: CLEAR
SL AMB POCT COLOR,UA: ABNORMAL
SL AMB POCT KETONES,UA: ABNORMAL
SL AMB POCT NITRITE,UA: ABNORMAL
SL AMB POCT PH,UA: 5
SL AMB POCT SPECIFIC GRAVITY,UA: 1.02
SL AMB POCT URINE PROTEIN: NORMAL
SL AMB POCT UROBILINOGEN: NORMAL
SP GR UR STRIP.AUTO: 1.02 (ref 1–1.03)
UROBILINOGEN UR QL STRIP.AUTO: 0.2 E.U./DL
WBC #/AREA URNS AUTO: ABNORMAL /HPF

## 2019-12-02 PROCEDURE — 87086 URINE CULTURE/COLONY COUNT: CPT | Performed by: INTERNAL MEDICINE

## 2019-12-02 PROCEDURE — 87186 SC STD MICRODIL/AGAR DIL: CPT | Performed by: INTERNAL MEDICINE

## 2019-12-02 PROCEDURE — 99213 OFFICE O/P EST LOW 20 MIN: CPT | Performed by: INTERNAL MEDICINE

## 2019-12-02 PROCEDURE — 81002 URINALYSIS NONAUTO W/O SCOPE: CPT | Performed by: INTERNAL MEDICINE

## 2019-12-02 PROCEDURE — 87077 CULTURE AEROBIC IDENTIFY: CPT | Performed by: INTERNAL MEDICINE

## 2019-12-02 PROCEDURE — 3008F BODY MASS INDEX DOCD: CPT | Performed by: INTERNAL MEDICINE

## 2019-12-02 PROCEDURE — 81001 URINALYSIS AUTO W/SCOPE: CPT | Performed by: INTERNAL MEDICINE

## 2019-12-02 RX ORDER — NITROFURANTOIN 25; 75 MG/1; MG/1
100 CAPSULE ORAL 2 TIMES DAILY
Qty: 10 CAPSULE | Refills: 0 | Status: SHIPPED | OUTPATIENT
Start: 2019-12-02 | End: 2019-12-07

## 2019-12-02 NOTE — PATIENT INSTRUCTIONS
Problem List Items Addressed This Visit     None      Visit Diagnoses     Acute cystitis without hematuria    -  Primary    Recommend Macrodantin 100 mg twice a day for 5 days and forcing fluids      Relevant Medications    nitrofurantoin (MACROBID) 100 mg capsule    Other Relevant Orders    UA w Reflex to Microscopic w Reflex to Culture

## 2019-12-02 NOTE — PROGRESS NOTES
Assessment/Plan:    Problem List Items Addressed This Visit     None      Visit Diagnoses     Acute cystitis without hematuria    -  Primary    Recommend Macrodantin 100 mg twice a day for 5 days and forcing fluids  Relevant Medications    nitrofurantoin (MACROBID) 100 mg capsule    Other Relevant Orders    UA w Reflex to Microscopic w Reflex to Culture          Chief Complaint     Possible UTI          Patient ID: Lowell Dey is a 42 y o  female reports increased urinary frequency and some burning for the past 4 days  No fever, chills, sweats, or hematuria  LMP 12/28/19  She has had UTIs in the past        Objective:    /72 (BP Location: Right arm, Patient Position: Sitting, Cuff Size: Standard)   Pulse 70   Ht 5' 2" (1 575 m)   Wt 79 9 kg (176 lb 2 4 oz)   SpO2 98%   BMI 32 22 kg/m²     Wt Readings from Last 3 Encounters:   12/02/19 79 9 kg (176 lb 2 4 oz)   08/09/19 75 8 kg (167 lb)   07/31/19 75 8 kg (167 lb)         Physical Exam    General:  Well-developed, well-nourished, in no acute distress  Abdomen:  Soft, nontender, normoactive bowel sounds, no palpable masses, no hepatosplenomegaly

## 2019-12-04 ENCOUNTER — TELEPHONE (OUTPATIENT)
Dept: FAMILY MEDICINE CLINIC | Facility: CLINIC | Age: 39
End: 2019-12-04

## 2019-12-04 LAB
BACTERIA UR CULT: ABNORMAL
BACTERIA UR CULT: ABNORMAL

## 2019-12-04 NOTE — TELEPHONE ENCOUNTER
Please contact Mamta and let her know that her urine culture did confirm that she had a urinary tract infection  The bacteria is sensitive to Macrodantin which is the drug that we prescribed for her  Also, [facility]

## 2019-12-19 ENCOUNTER — OFFICE VISIT (OUTPATIENT)
Dept: FAMILY MEDICINE CLINIC | Facility: CLINIC | Age: 39
End: 2019-12-19
Payer: COMMERCIAL

## 2019-12-19 VITALS
HEART RATE: 74 BPM | OXYGEN SATURATION: 98 % | BODY MASS INDEX: 32.13 KG/M2 | HEIGHT: 62 IN | WEIGHT: 174.6 LBS | SYSTOLIC BLOOD PRESSURE: 110 MMHG | DIASTOLIC BLOOD PRESSURE: 74 MMHG

## 2019-12-19 DIAGNOSIS — Z23 NEEDS FLU SHOT: Primary | ICD-10-CM

## 2019-12-19 DIAGNOSIS — E04.9 GOITER: ICD-10-CM

## 2019-12-19 DIAGNOSIS — Z00.00 ANNUAL PHYSICAL EXAM: ICD-10-CM

## 2019-12-19 DIAGNOSIS — L30.9 ECZEMA OF LEFT HAND: ICD-10-CM

## 2019-12-19 DIAGNOSIS — Z13.220 ENCOUNTER FOR SCREENING FOR LIPID DISORDER: ICD-10-CM

## 2019-12-19 DIAGNOSIS — Z11.4 ENCOUNTER FOR SCREENING FOR HIV: ICD-10-CM

## 2019-12-19 PROCEDURE — 99395 PREV VISIT EST AGE 18-39: CPT | Performed by: INTERNAL MEDICINE

## 2019-12-19 RX ORDER — BETAMETHASONE DIPROPIONATE 0.5 MG/G
CREAM TOPICAL 2 TIMES DAILY PRN
Qty: 30 G | Refills: 1 | COMMUNITY
Start: 2019-12-19 | End: 2020-02-10 | Stop reason: SDUPTHER

## 2019-12-19 NOTE — PATIENT INSTRUCTIONS
Get fasting blood work in the next 1-2 weeks  Focus on eating a plant based diet  To learn about how to lose weight on a plant-based diet, go to www  forksoverknives com  There is good evidence that eating a plant-based diet can have dramatic effects on overall health, weight, cholesterol, diabetes and other health issues  Please review this website https://[employer]/  com/ and read some of the success stories of people who have adopted a plant-based diet  Wellness Visit for Adults   AMBULATORY CARE:   A wellness visit  is when you see your healthcare provider to get screened for health problems  You can also get advice on how to stay healthy  Write down your questions so you remember to ask them  Ask your healthcare provider how often you should have a wellness visit  What happens at a wellness visit:  Your healthcare provider will ask about your health, and your family history of health problems  This includes high blood pressure, heart disease, and cancer  He or she will ask if you have symptoms that concern you, if you smoke, and about your mood  You may also be asked about your intake of medicines, supplements, food, and alcohol  Any of the following may be done:  · Your weight  will be checked  Your height may also be checked so your body mass index (BMI) can be calculated  Your BMI shows if you are at a healthy weight  · Your blood pressure  and heart rate will be checked  Your temperature may also be checked  · Blood and urine tests  may be done  Blood tests may be done to check your cholesterol levels  Abnormal cholesterol levels increase your risk for heart disease and stroke  You may also need a blood or urine test to check for diabetes if you are at increased risk  Urine tests may be done to look for signs of an infection or kidney disease  · A physical exam  includes checking your heartbeat and lungs with a stethoscope   Your healthcare provider may also check your skin to look for sun damage  · Screening tests  may be recommended  A screening test is done to check for diseases that may not cause symptoms  The screening tests you may need depend on your age, gender, family history, and lifestyle habits  For example, colorectal screening may be recommended if you are 48years old or older  Screening tests you need if you are a woman:   · A Pap smear  is used to screen for cervical cancer  Pap smears are usually done every 3 to 5 years depending on your age  You may need them more often if you have had abnormal Pap smear test results in the past  Ask your healthcare provider how often you should have a Pap smear  · A mammogram  is an x-ray of your breasts to screen for breast cancer  Experts recommend mammograms every 2 years starting at age 48 years  You may need a mammogram at age 52 years or younger if you have an increased risk for breast cancer  Talk to your healthcare provider about when you should start having mammograms and how often you need them  Vaccines you may need:   · Get an influenza vaccine  every year  The influenza vaccine protects you from the flu  Several types of viruses cause the flu  The viruses change over time, so new vaccines are made each year  · Get a tetanus-diphtheria (Td) booster vaccine  every 10 years  This vaccine protects you against tetanus and diphtheria  Tetanus is a severe infection that may cause painful muscle spasms and lockjaw  Diphtheria is a severe bacterial infection that causes a thick covering in the back of your mouth and throat  · Get a human papillomavirus (HPV) vaccine  if you are female and aged 23 to 32 or male 23 to 24 and never received it  This vaccine protects you from HPV infection  HPV is the most common infection spread by sexual contact  HPV may also cause vaginal, penile, and anal cancers  · Get a pneumococcal vaccine  if you are aged 72 years or older   The pneumococcal vaccine is an injection given to protect you from pneumococcal disease  Pneumococcal disease is an infection caused by pneumococcal bacteria  The infection may cause pneumonia, meningitis, or an ear infection  · Get a shingles vaccine  if you are aged 61 or older, even if you have had shingles before  The shingles vaccine is an injection to protect you from the varicella-zoster virus  This is the same virus that causes chickenpox  Shingles is a painful rash that develops in people who had chickenpox or have been exposed to the virus  How to eat healthy:  My Plate is a model for planning healthy meals  It shows the types and amounts of foods that should go on your plate  Fruits and vegetables make up about half of your plate, and grains and protein make up the other half  A serving of dairy is included on the side of your plate  The amount of calories and serving sizes you need depends on your age, gender, weight, and height  Examples of healthy foods are listed below:  · Eat a variety of vegetables  such as dark green, red, and orange vegetables  You can also include canned vegetables low in sodium (salt) and frozen vegetables without added butter or sauces  · Eat a variety of fresh fruits , canned fruit in 100% juice, frozen fruit, and dried fruit  · Include whole grains  At least half of the grains you eat should be whole grains  Examples include whole-wheat bread, wheat pasta, brown rice, and whole-grain cereals such as oatmeal     · Eat a variety of protein foods such as seafood (fish and shellfish), lean meat, and poultry without skin (turkey and chicken)  Examples of lean meats include pork leg, shoulder, or tenderloin, and beef round, sirloin, tenderloin, and extra lean ground beef  Other protein foods include eggs and egg substitutes, beans, peas, soy products, nuts, and seeds  · Choose low-fat dairy products such as skim or 1% milk or low-fat yogurt, cheese, and cottage cheese       · Limit unhealthy fats  such as butter, hard margarine, and shortening  Exercise:  Exercise at least 30 minutes per day on most days of the week  Some examples of exercise include walking, biking, dancing, and swimming  You can also fit in more physical activity by taking the stairs instead of the elevator or parking farther away from stores  Include muscle strengthening activities 2 days each week  Regular exercise provides many health benefits  It helps you manage your weight, and decreases your risk for type 2 diabetes, heart disease, stroke, and high blood pressure  Exercise can also help improve your mood  Ask your healthcare provider about the best exercise plan for you  General health and safety guidelines:   · Do not smoke  Nicotine and other chemicals in cigarettes and cigars can cause lung damage  Ask your healthcare provider for information if you currently smoke and need help to quit  E-cigarettes or smokeless tobacco still contain nicotine  Talk to your healthcare provider before you use these products  · Limit alcohol  A drink of alcohol is 12 ounces of beer, 5 ounces of wine, or 1½ ounces of liquor  · Lose weight, if needed  Being overweight increases your risk of certain health conditions  These include heart disease, high blood pressure, type 2 diabetes, and certain types of cancer  · Protect your skin  Do not sunbathe or use tanning beds  Use sunscreen with a SPF 15 or higher  Apply sunscreen at least 15 minutes before you go outside  Reapply sunscreen every 2 hours  Wear protective clothing, hats, and sunglasses when you are outside  · Drive safely  Always wear your seatbelt  Make sure everyone in your car wears a seatbelt  A seatbelt can save your life if you are in an accident  Do not use your cell phone when you are driving  This could distract you and cause an accident  Pull over if you need to make a call or send a text message  · Practice safe sex    Use latex condoms if are sexually active and have more than one partner  Your healthcare provider may recommend screening tests for sexually transmitted infections (STIs)  · Wear helmets, lifejackets, and protective gear  Always wear a helmet when you ride a bike or motorcycle, go skiing, or play sports that could cause a head injury  Wear protective equipment when you play sports  Wear a lifejacket when you are on a boat or doing water sports  © 2017 [facility] Information is for End User's use only and may not be sold, redistributed or otherwise used for commercial purposes  All illustrations and images included in CareNotes® are the copyrighted property of A D A M , Inc  or Rip Rodriguez  The above information is an  only  It is not intended as medical advice for individual conditions or treatments  Talk to your doctor, nurse or pharmacist before following any medical regimen to see if it is safe and effective for you

## 2019-12-19 NOTE — LETTER
December 19, 2019     Patient: Landon Acosta   YOB: 1980   Date of Visit: 12/19/2019       To Whom it May Concern:    Landon Acosta is under my professional care  She was seen in my office on 12/19/2019  If you have any questions or concerns, please don't hesitate to call           Sincerely,          Jessi Sena MD

## 2019-12-23 ENCOUNTER — APPOINTMENT (OUTPATIENT)
Dept: LAB | Facility: CLINIC | Age: 39
End: 2019-12-23
Payer: COMMERCIAL

## 2019-12-23 ENCOUNTER — TRANSCRIBE ORDERS (OUTPATIENT)
Dept: ADMINISTRATIVE | Facility: HOSPITAL | Age: 39
End: 2019-12-23

## 2019-12-23 DIAGNOSIS — Z11.4 ENCOUNTER FOR SCREENING FOR HIV: ICD-10-CM

## 2019-12-23 DIAGNOSIS — E04.9 GOITER: ICD-10-CM

## 2019-12-23 DIAGNOSIS — Z13.220 ENCOUNTER FOR SCREENING FOR LIPID DISORDER: ICD-10-CM

## 2019-12-23 LAB
CHOLEST SERPL-MCNC: 164 MG/DL (ref 50–200)
HDLC SERPL-MCNC: 61 MG/DL
LDLC SERPL CALC-MCNC: 93 MG/DL (ref 0–100)
NONHDLC SERPL-MCNC: 103 MG/DL
TRIGL SERPL-MCNC: 48 MG/DL
TSH SERPL DL<=0.05 MIU/L-ACNC: 1.85 UIU/ML (ref 0.36–3.74)

## 2019-12-23 PROCEDURE — 36415 COLL VENOUS BLD VENIPUNCTURE: CPT

## 2019-12-23 PROCEDURE — 84443 ASSAY THYROID STIM HORMONE: CPT

## 2019-12-23 PROCEDURE — 80061 LIPID PANEL: CPT

## 2019-12-23 PROCEDURE — 87389 HIV-1 AG W/HIV-1&-2 AB AG IA: CPT

## 2019-12-24 ENCOUNTER — TELEPHONE (OUTPATIENT)
Dept: FAMILY MEDICINE CLINIC | Facility: CLINIC | Age: 39
End: 2019-12-24

## 2019-12-25 LAB — HIV 1+2 AB+HIV1 P24 AG SERPL QL IA: NORMAL

## 2020-02-10 ENCOUNTER — OFFICE VISIT (OUTPATIENT)
Dept: FAMILY MEDICINE CLINIC | Facility: CLINIC | Age: 40
End: 2020-02-10
Payer: COMMERCIAL

## 2020-02-10 VITALS
SYSTOLIC BLOOD PRESSURE: 122 MMHG | WEIGHT: 178.13 LBS | BODY MASS INDEX: 32.78 KG/M2 | HEIGHT: 62 IN | OXYGEN SATURATION: 97 % | DIASTOLIC BLOOD PRESSURE: 78 MMHG | HEART RATE: 65 BPM

## 2020-02-10 DIAGNOSIS — Z23 NEEDS FLU SHOT: Primary | ICD-10-CM

## 2020-02-10 DIAGNOSIS — L30.9 ECZEMA OF LEFT HAND: ICD-10-CM

## 2020-02-10 PROCEDURE — 99213 OFFICE O/P EST LOW 20 MIN: CPT | Performed by: INTERNAL MEDICINE

## 2020-02-10 PROCEDURE — 1036F TOBACCO NON-USER: CPT | Performed by: INTERNAL MEDICINE

## 2020-02-10 PROCEDURE — 3008F BODY MASS INDEX DOCD: CPT | Performed by: INTERNAL MEDICINE

## 2020-02-10 RX ORDER — BETAMETHASONE DIPROPIONATE 0.5 MG/G
CREAM TOPICAL 2 TIMES DAILY PRN
Qty: 45 G | Refills: 1 | Status: SHIPPED | OUTPATIENT
Start: 2020-02-10 | End: 2020-12-28

## 2020-02-10 NOTE — PROGRESS NOTES
Assessment/Plan:    Problem List Items Addressed This Visit     None      Visit Diagnoses     Needs flu shot    -  Primary    Relevant Orders    influenza vaccine, 0067-1931, quadrivalent, 0 5 mL, preservative-free, for adult and pediatric patients 6 mos+ (AFLURIA, FLUARIX, FLULAVAL, FLUZONE)    Eczema of left hand        Would apply betamethasone to the rash on the wrist and hands twice a day for 7-14 days  Relevant Medications    betamethasone dipropionate (DIPROSONE) 0 05 % cream      Reviewed her labs  She has an excellent lipid panel, TSH was normal, HIV was negative  Chief Complaint     Follow-up          Patient ID: Sarah Escobar is a 42 y o  female here for follow-up of hand eczema and to review lab results  The rash on her right hand resolved after using the betamethasone for couple of weeks  However, the rash on the dorsum of the left hand has improved but did not resolve completely  Objective:    /78 (BP Location: Left arm, Patient Position: Sitting, Cuff Size: Large)   Pulse 65   Ht 5' 2" (1 575 m)   Wt 80 8 kg (178 lb 2 1 oz)   SpO2 97%   BMI 32 58 kg/m²     Wt Readings from Last 3 Encounters:   02/10/20 80 8 kg (178 lb 2 1 oz)   12/19/19 79 2 kg (174 lb 9 7 oz)   12/02/19 79 9 kg (176 lb 2 4 oz)         Physical Exam    General:  Well-developed, well-nourished, in no acute distress    Skin:  Linear 2 cm patch of eczema on at the extensor crease of the left wrist

## 2020-02-10 NOTE — PATIENT INSTRUCTIONS
Will continue betamethasone cream twice a day to the left hand for another 3-4 weeks  If the rash resolves, can just come back as needed  Otherwise, schedule appointment

## 2020-05-26 ENCOUNTER — OFFICE VISIT (OUTPATIENT)
Dept: FAMILY MEDICINE CLINIC | Facility: CLINIC | Age: 40
End: 2020-05-26
Payer: COMMERCIAL

## 2020-05-26 VITALS
WEIGHT: 176.2 LBS | HEIGHT: 62 IN | SYSTOLIC BLOOD PRESSURE: 116 MMHG | TEMPERATURE: 97.6 F | OXYGEN SATURATION: 97 % | HEART RATE: 85 BPM | BODY MASS INDEX: 32.42 KG/M2 | DIASTOLIC BLOOD PRESSURE: 68 MMHG

## 2020-05-26 DIAGNOSIS — G43.009 MIGRAINE WITHOUT AURA AND WITHOUT STATUS MIGRAINOSUS, NOT INTRACTABLE: Primary | ICD-10-CM

## 2020-05-26 PROCEDURE — 99213 OFFICE O/P EST LOW 20 MIN: CPT | Performed by: INTERNAL MEDICINE

## 2020-05-26 PROCEDURE — 3008F BODY MASS INDEX DOCD: CPT | Performed by: INTERNAL MEDICINE

## 2020-05-26 PROCEDURE — 1036F TOBACCO NON-USER: CPT | Performed by: INTERNAL MEDICINE

## 2020-05-26 RX ORDER — SUMATRIPTAN 50 MG/1
50 TABLET, FILM COATED ORAL ONCE AS NEEDED
Qty: 9 TABLET | Refills: 3 | Status: SHIPPED | OUTPATIENT
Start: 2020-05-26 | End: 2020-12-28

## 2020-06-01 ENCOUNTER — TELEPHONE (OUTPATIENT)
Dept: FAMILY MEDICINE CLINIC | Facility: CLINIC | Age: 40
End: 2020-06-01

## 2020-06-01 DIAGNOSIS — G43.009 MIGRAINE WITHOUT AURA AND WITHOUT STATUS MIGRAINOSUS, NOT INTRACTABLE: Primary | ICD-10-CM

## 2020-06-02 RX ORDER — INDOMETHACIN 50 MG/1
50 CAPSULE ORAL
Qty: 15 CAPSULE | Refills: 0 | Status: SHIPPED | OUTPATIENT
Start: 2020-06-02 | End: 2022-07-19

## 2020-12-21 ENCOUNTER — OFFICE VISIT (OUTPATIENT)
Dept: URGENT CARE | Facility: CLINIC | Age: 40
End: 2020-12-21
Payer: COMMERCIAL

## 2020-12-21 VITALS
RESPIRATION RATE: 16 BRPM | HEIGHT: 62 IN | HEART RATE: 76 BPM | BODY MASS INDEX: 31.28 KG/M2 | OXYGEN SATURATION: 99 % | WEIGHT: 170 LBS | TEMPERATURE: 98.7 F

## 2020-12-21 DIAGNOSIS — Z20.822 ENCOUNTER FOR LABORATORY TESTING FOR COVID-19 VIRUS: ICD-10-CM

## 2020-12-21 DIAGNOSIS — R68.89 FLU-LIKE SYMPTOMS: Primary | ICD-10-CM

## 2020-12-21 PROCEDURE — 99212 OFFICE O/P EST SF 10 MIN: CPT | Performed by: EMERGENCY MEDICINE

## 2020-12-21 PROCEDURE — U0003 INFECTIOUS AGENT DETECTION BY NUCLEIC ACID (DNA OR RNA); SEVERE ACUTE RESPIRATORY SYNDROME CORONAVIRUS 2 (SARS-COV-2) (CORONAVIRUS DISEASE [COVID-19]), AMPLIFIED PROBE TECHNIQUE, MAKING USE OF HIGH THROUGHPUT TECHNOLOGIES AS DESCRIBED BY CMS-2020-01-R: HCPCS | Performed by: EMERGENCY MEDICINE

## 2020-12-23 LAB — SARS-COV-2 RNA SPEC QL NAA+PROBE: DETECTED

## 2020-12-28 ENCOUNTER — TELEMEDICINE (OUTPATIENT)
Dept: FAMILY MEDICINE CLINIC | Facility: CLINIC | Age: 40
End: 2020-12-28
Payer: COMMERCIAL

## 2020-12-28 DIAGNOSIS — U07.1 2019 NOVEL CORONAVIRUS DISEASE (COVID-19): Primary | ICD-10-CM

## 2020-12-28 PROCEDURE — 99213 OFFICE O/P EST LOW 20 MIN: CPT | Performed by: INTERNAL MEDICINE

## 2020-12-28 PROCEDURE — 1036F TOBACCO NON-USER: CPT | Performed by: INTERNAL MEDICINE

## 2020-12-30 ENCOUNTER — PATIENT MESSAGE (OUTPATIENT)
Dept: FAMILY MEDICINE CLINIC | Facility: CLINIC | Age: 40
End: 2020-12-30

## 2021-04-14 ENCOUNTER — TELEMEDICINE (OUTPATIENT)
Dept: FAMILY MEDICINE CLINIC | Facility: CLINIC | Age: 41
End: 2021-04-14
Payer: COMMERCIAL

## 2021-04-14 VITALS — HEIGHT: 62 IN | BODY MASS INDEX: 31.28 KG/M2 | WEIGHT: 170 LBS

## 2021-04-14 DIAGNOSIS — R10.84 GENERALIZED ABDOMINAL PAIN: ICD-10-CM

## 2021-04-14 DIAGNOSIS — R19.7 DIARRHEA, UNSPECIFIED TYPE: ICD-10-CM

## 2021-04-14 DIAGNOSIS — R11.0 NAUSEA: Primary | ICD-10-CM

## 2021-04-14 PROCEDURE — 1036F TOBACCO NON-USER: CPT | Performed by: NURSE PRACTITIONER

## 2021-04-14 PROCEDURE — 99213 OFFICE O/P EST LOW 20 MIN: CPT | Performed by: NURSE PRACTITIONER

## 2021-04-14 PROCEDURE — 3008F BODY MASS INDEX DOCD: CPT | Performed by: NURSE PRACTITIONER

## 2021-04-14 RX ORDER — ONDANSETRON 4 MG/1
4 TABLET, ORALLY DISINTEGRATING ORAL EVERY 6 HOURS PRN
Qty: 20 TABLET | Refills: 0 | Status: SHIPPED | OUTPATIENT
Start: 2021-04-14 | End: 2022-07-19

## 2021-04-14 RX ORDER — DICYCLOMINE HYDROCHLORIDE 10 MG/1
10 CAPSULE ORAL
Qty: 30 CAPSULE | Refills: 0 | Status: SHIPPED | OUTPATIENT
Start: 2021-04-14 | End: 2022-07-19

## 2021-04-14 NOTE — PROGRESS NOTES
Virtual Regular Visit      Assessment/Plan:    Problem List Items Addressed This Visit     None      Visit Diagnoses     Nausea    -  Primary    Relevant Medications    ondansetron (ZOFRAN-ODT) 4 mg disintegrating tablet    dicyclomine (BENTYL) 10 mg capsule    Diarrhea, unspecified type        Relevant Medications    ondansetron (ZOFRAN-ODT) 4 mg disintegrating tablet    dicyclomine (BENTYL) 10 mg capsule    Generalized abdominal pain        Relevant Medications    ondansetron (ZOFRAN-ODT) 4 mg disintegrating tablet    dicyclomine (BENTYL) 10 mg capsule           Suspect food poisoning, will treat symptoms, to make us aware if no improvement in 24 to 48 hours  Reason for visit is   Chief Complaint   Patient presents with    food poisoning    Virtual Regular Visit        Encounter provider SOLEDAD Allen    Provider located at [address] [place], [place] [address]  [address] [place] [id number]      Recent Visits  No visits were found meeting these conditions  Showing recent visits within past 7 days and meeting all other requirements     Today's Visits  Date Type Provider Dept   04/14/21 Telemedicine [facility]   Showing today's visits and meeting all other requirements     Future Appointments  No visits were found meeting these conditions  Showing future appointments within next 150 days and meeting all other requirements        The patient was identified by name and date of birth  Tonia Johnson was informed that this is a telemedicine visit and that the visit is being conducted through [address] [place] [address] Now and patient was informed that this is a secure, HIPAA-compliant platform  She agrees to proceed     My office door was closed  No one else was in the room  She acknowledged consent and understanding of privacy and security of the video platform   The patient has agreed to participate and understands they can discontinue the visit at any time  Patient is aware this is a billable service  Jock Mortimer is a 47 y o  female       Onset of nausea, diarrhea and abdominal pain after eating at a restaurant -  with similar symptoms  Past Medical History:   Diagnosis Date    Known health problems: none        Past Surgical History:   Procedure Laterality Date    NO PAST SURGERIES         Current Outpatient Medications   Medication Sig Dispense Refill    dicyclomine (BENTYL) 10 mg capsule Take 1 capsule (10 mg total) by mouth 4 (four) times a day (before meals and at bedtime) 30 capsule 0    indomethacin (INDOCIN) 50 mg capsule Take 1 capsule (50 mg total) by mouth 3 (three) times a day with meals for 5 days 15 capsule 0    ondansetron (ZOFRAN-ODT) 4 mg disintegrating tablet Take 1 tablet (4 mg total) by mouth every 6 (six) hours as needed for nausea or vomiting 20 tablet 0     No current facility-administered medications for this visit  No Known Allergies    Review of Systems   Constitutional: Negative  Respiratory: Negative  Cardiovascular: Negative  Gastrointestinal: Positive for abdominal pain, diarrhea and nausea  Video Exam    Vitals:    04/14/21 1713   Weight: 77 1 kg (170 lb)   Height: 5' 2" (1 575 m)       Physical Exam  Pulmonary:      Effort: Pulmonary effort is normal    Neurological:      Mental Status: She is alert and oriented to person, place, and time  I spent 6 minutes directly with the patient during this visit      Deepalidl Lou acknowledges that she has consented to an online visit or consultation  She understands that the online visit is based solely on information provided by her, and that, in the absence of a face-to-face physical evaluation by the physician, the diagnosis she receives is both limited and provisional in terms of accuracy and completeness   This is not intended to replace a full medical face-to-face evaluation by the physician  Zeyad Barraza understands and accepts these terms

## 2021-04-14 NOTE — LETTER
April 14, 2021     Patient: Bobby Alonso   YOB: 1980   Date of Visit: 4/14/2021       To Whom it May Concern:    Bobby Alonso is under my professional care  She was seen by me on 4/14/2021  Please excuse her from work from 04/14/2021 to 04/16/0021  If you have any questions or concerns, please don't hesitate to call           Sincerely,          Carolyn Benson

## 2021-07-13 ENCOUNTER — OFFICE VISIT (OUTPATIENT)
Dept: URGENT CARE | Facility: CLINIC | Age: 41
End: 2021-07-13
Payer: COMMERCIAL

## 2021-07-13 VITALS
SYSTOLIC BLOOD PRESSURE: 133 MMHG | WEIGHT: 170 LBS | HEIGHT: 62 IN | HEART RATE: 70 BPM | DIASTOLIC BLOOD PRESSURE: 79 MMHG | RESPIRATION RATE: 16 BRPM | TEMPERATURE: 98.7 F | BODY MASS INDEX: 31.28 KG/M2 | OXYGEN SATURATION: 100 %

## 2021-07-13 DIAGNOSIS — N30.01 ACUTE CYSTITIS WITH HEMATURIA: Primary | ICD-10-CM

## 2021-07-13 LAB
SL AMB  POCT GLUCOSE, UA: NEGATIVE
SL AMB LEUKOCYTE ESTERASE,UA: ABNORMAL
SL AMB POCT BILIRUBIN,UA: NEGATIVE
SL AMB POCT BLOOD,UA: ABNORMAL
SL AMB POCT CLARITY,UA: CLEAR
SL AMB POCT COLOR,UA: YELLOW
SL AMB POCT KETONES,UA: ABNORMAL
SL AMB POCT NITRITE,UA: NEGATIVE
SL AMB POCT PH,UA: 7
SL AMB POCT SPECIFIC GRAVITY,UA: 1.02
SL AMB POCT URINE PROTEIN: NEGATIVE
SL AMB POCT UROBILINOGEN: NEGATIVE

## 2021-07-13 PROCEDURE — 87186 SC STD MICRODIL/AGAR DIL: CPT | Performed by: PHYSICIAN ASSISTANT

## 2021-07-13 PROCEDURE — 87086 URINE CULTURE/COLONY COUNT: CPT | Performed by: PHYSICIAN ASSISTANT

## 2021-07-13 PROCEDURE — 87077 CULTURE AEROBIC IDENTIFY: CPT | Performed by: PHYSICIAN ASSISTANT

## 2021-07-13 PROCEDURE — 99213 OFFICE O/P EST LOW 20 MIN: CPT | Performed by: PHYSICIAN ASSISTANT

## 2021-07-13 PROCEDURE — 81002 URINALYSIS NONAUTO W/O SCOPE: CPT | Performed by: PHYSICIAN ASSISTANT

## 2021-07-13 RX ORDER — PHENAZOPYRIDINE HYDROCHLORIDE 200 MG/1
200 TABLET, FILM COATED ORAL
Qty: 10 TABLET | Refills: 0 | Status: SHIPPED | OUTPATIENT
Start: 2021-07-13 | End: 2022-07-19

## 2021-07-13 RX ORDER — CEPHALEXIN 500 MG/1
500 CAPSULE ORAL EVERY 12 HOURS SCHEDULED
Qty: 6 CAPSULE | Refills: 0 | Status: SHIPPED | OUTPATIENT
Start: 2021-07-13 | End: 2021-07-16

## 2021-07-13 NOTE — PATIENT INSTRUCTIONS
Take antibiotic as prescribed  Complete full dose of antibiotics even if symptoms begin to improve or resolve  May use OTC Tylenol for fever  DRINK LOTS OF FLUIDS  Observe for signs of worsening infection including increased pain, discharge, blood in the urine, back or flank pain, fever or chills, or persistent symptoms  Your symptoms should begin to improve over the next couple days  Follow-up with your PCP in 3-5 days if symptoms worsen or do not improve  Go to ER if symptoms become severe  Urinary Tract Infection in Women   WHAT YOU NEED TO KNOW:   A urinary tract infection (UTI) is caused by bacteria that get inside your urinary tract  Most bacteria that enter your urinary tract come out when you urinate  If the bacteria stay in your urinary tract, you may get an infection  Your urinary tract includes your kidneys, ureters, bladder, and urethra  Urine is made in your kidneys, and it flows from the ureters to the bladder  Urine leaves the bladder through the urethra  A UTI is more common in your lower urinary tract, which includes your bladder and urethra  DISCHARGE INSTRUCTIONS:   Return to the emergency department if:   · You are urinating very little or not at all  · You have a high fever with shaking chills  · You have side or back pain that gets worse  Call your doctor if:   · You have a fever  · You do not feel better after 2 days of taking antibiotics  · You are vomiting  · You have questions or concerns about your condition or care  Medicines:   · Antibiotics  help fight a bacterial infection  If you have UTIs often (called recurrent UTIs), you may be given antibiotics to take regularly  You will be given directions for when and how to use antibiotics  The goal is to prevent UTIs but not cause antibiotic resistance by using antibiotics too often  · Medicines  may be given to decrease pain and burning when you urinate   They will also help decrease the feeling that you need to urinate often  These medicines will make your urine orange or red  · Take your medicine as directed  Contact your healthcare provider if you think your medicine is not helping or if you have side effects  Tell him or her if you are allergic to any medicine  Keep a list of the medicines, vitamins, and herbs you take  Include the amounts, and when and why you take them  Bring the list or the pill bottles to follow-up visits  Carry your medicine list with you in case of an emergency  Follow up with your healthcare provider as directed:  Write down your questions so you remember to ask them during your visits  Prevent another UTI:   · Empty your bladder often  Urinate and empty your bladder as soon as you feel the need  Do not hold your urine for long periods of time  · Wipe from front to back after you urinate or have a bowel movement  This will help prevent germs from getting into your urinary tract through your urethra  · Drink liquids as directed  Ask how much liquid to drink each day and which liquids are best for you  You may need to drink more liquids than usual to help flush out the bacteria  Do not drink alcohol, caffeine, or citrus juices  These can irritate your bladder and increase your symptoms  Your healthcare provider may recommend cranberry juice to help prevent a UTI  · Urinate after you have sex  This can help flush out bacteria passed during sex  · Do not douche or use feminine deodorants  These can change the chemical balance in your vagina  · Change sanitary pads or tampons often  This will help prevent germs from getting into your urinary tract  · Talk to your healthcare provider about your birth control method  You may need to change your method if it is increasing your risk for UTIs  · Wear cotton underwear and clothes that are loose  Tight pants and nylon underwear can trap moisture and cause bacteria to grow      · Vaginal estrogen may be recommended  This medicine helps prevent UTIs in women who have gone through menopause or are in torie-menopause  · Do pelvic muscle exercises often  Pelvic muscle exercises may help you start and stop urinating  Strong pelvic muscles may help you empty your bladder easier  Squeeze these muscles tightly for 5 seconds like you are trying to hold back urine  Then relax for 5 seconds  Gradually work up to squeezing for 10 seconds  Do 3 sets of 15 repetitions a day, or as directed  © Copyright [facility] [address] Information is for End User's use only and may not be sold, redistributed or otherwise used for commercial purposes  All illustrations and images included in CareNotes® are the copyrighted property of A D A M , Inc  or [address] [place] [address]  The above information is an  only  It is not intended as medical advice for individual conditions or treatments  Talk to your doctor, nurse or pharmacist before following any medical regimen to see if it is safe and effective for you

## 2021-07-13 NOTE — PROGRESS NOTES
[facility]        NAME: Az Marie is a 47 y o  female  : 1980    MRN: [id number]  DATE: 2021  TIME: 5:47 PM    Assessment and Plan   Acute cystitis with hematuria [N30 01]  1  Acute cystitis with hematuria  Urine culture    POCT urine dip    cephalexin (KEFLEX) 500 mg capsule    phenazopyridine (PYRIDIUM) 200 mg tablet    CANCELED: POCT blood glucose         Patient Instructions   Take antibiotic as prescribed  Complete full dose of antibiotics even if symptoms begin to improve or resolve  May use OTC Tylenol for fever  DRINK LOTS OF FLUIDS  Observe for signs of worsening infection including increased pain, discharge, blood in the urine, back or flank pain, fever or chills, or persistent symptoms  Your symptoms should begin to improve over the next couple days  Follow-up with your PCP in 3-5 days if symptoms worsen or do not improve  Go to ER if symptoms become severe  Follow up with PCP in 3-5 days  Proceed to  ER if symptoms worsen  Chief Complaint     Chief Complaint   Patient presents with    Possible UTI     pain and burning upon urination         History of Present Illness        Patient is a 72-year-old female with no significant past medical history presents the office complaining of dysuria for 1 day  Urinary Tract Infection   This is a new problem  The current episode started yesterday  The problem occurs every urination  The problem has been gradually worsening  There has been no fever  Associated symptoms include frequency, hesitancy and urgency  Pertinent negatives include no chills, discharge, flank pain, hematuria, nausea or vomiting  She has tried nothing for the symptoms  Review of Systems   Review of Systems   Constitutional: Negative for chills and fever  Gastrointestinal: Negative for abdominal pain, diarrhea, nausea and vomiting  Genitourinary: Positive for dysuria, frequency, hesitancy and urgency   Negative for decreased urine volume, difficulty urinating, enuresis, flank pain, hematuria, pelvic pain, vaginal bleeding, vaginal discharge and vaginal pain  Skin: Negative for rash  Current Medications       Current Outpatient Medications:     indomethacin (INDOCIN) 50 mg capsule, Take 1 capsule (50 mg total) by mouth 3 (three) times a day with meals for 5 days, Disp: 15 capsule, Rfl: 0    cephalexin (KEFLEX) 500 mg capsule, Take 1 capsule (500 mg total) by mouth every 12 (twelve) hours for 3 days, Disp: 6 capsule, Rfl: 0    dicyclomine (BENTYL) 10 mg capsule, Take 1 capsule (10 mg total) by mouth 4 (four) times a day (before meals and at bedtime), Disp: 30 capsule, Rfl: 0    ondansetron (ZOFRAN-ODT) 4 mg disintegrating tablet, Take 1 tablet (4 mg total) by mouth every 6 (six) hours as needed for nausea or vomiting, Disp: 20 tablet, Rfl: 0    phenazopyridine (PYRIDIUM) 200 mg tablet, Take 1 tablet (200 mg total) by mouth 3 (three) times a day with meals, Disp: 10 tablet, Rfl: 0    Current Allergies     Allergies as of 07/13/2021    (No Known Allergies)            The following portions of the patient's history were reviewed and updated as appropriate: allergies, current medications, past family history, past medical history, past social history, past surgical history and problem list      Past Medical History:   Diagnosis Date    Known health problems: none     Psoriasis        Past Surgical History:   Procedure Laterality Date    NO PAST SURGERIES         Family History   Problem Relation Age of Onset    Diabetes type II Mother         without complication     Diabetes type II Father         without complication     Cancer Father     Breast cancer Paternal Grandmother     No Known Problems Paternal Aunt          Medications have been verified          Objective   /79   Pulse 70   Temp 98 7 °F (37 1 °C)   Resp 16   Ht 5' 2" (1 575 m)   Wt 77 1 kg (170 lb)   LMP 06/29/2021   SpO2 100%   BMI 31 09 kg/m² Patient's last menstrual period was 06/29/2021  Physical Exam     Physical Exam  Vitals and nursing note reviewed  Constitutional:       Appearance: Normal appearance  She is well-developed  HENT:      Head: Normocephalic and atraumatic  Right Ear: External ear normal       Left Ear: External ear normal       Nose: Nose normal    Eyes:      General: Lids are normal    Cardiovascular:      Rate and Rhythm: Normal rate and regular rhythm  Pulses: Normal pulses  Heart sounds: Normal heart sounds  No murmur heard  No friction rub  No gallop  Pulmonary:      Effort: Pulmonary effort is normal       Breath sounds: Normal breath sounds  No wheezing, rhonchi or rales  Abdominal:      General: Bowel sounds are normal       Palpations: Abdomen is soft  Tenderness: There is no abdominal tenderness  There is no right CVA tenderness or left CVA tenderness  Musculoskeletal:         General: Normal range of motion  Lymphadenopathy:      Cervical: No cervical adenopathy  Skin:     General: Skin is warm and dry  Capillary Refill: Capillary refill takes less than 2 seconds  Neurological:      Mental Status: She is alert             Urine Dip:    LEUKOCYTE ESTERASE,UA small    NITRITE,UA negative    SL AMB POCT UROBILINOGEN negative    POCT URINE PROTEIN negative     PH,UA 7 0    BLOOD,UA moderate    SPECIFIC GRAVITY,UA 1 025    KETONES,UA moderate    BILIRUBIN,UA negative    GLUCOSE, UA negative     COLOR,UA yellow    CLARITY,UA clear

## 2021-07-15 LAB — BACTERIA UR CULT: ABNORMAL

## 2021-08-13 ENCOUNTER — OFFICE VISIT (OUTPATIENT)
Dept: URGENT CARE | Facility: CLINIC | Age: 41
End: 2021-08-13
Payer: COMMERCIAL

## 2021-08-13 VITALS
WEIGHT: 170 LBS | BODY MASS INDEX: 31.28 KG/M2 | OXYGEN SATURATION: 98 % | SYSTOLIC BLOOD PRESSURE: 131 MMHG | TEMPERATURE: 97.7 F | HEART RATE: 70 BPM | RESPIRATION RATE: 16 BRPM | DIASTOLIC BLOOD PRESSURE: 74 MMHG | HEIGHT: 62 IN

## 2021-08-13 DIAGNOSIS — M54.42 ACUTE LEFT-SIDED LOW BACK PAIN WITH LEFT-SIDED SCIATICA: ICD-10-CM

## 2021-08-13 DIAGNOSIS — R35.0 INCREASED URINARY FREQUENCY: Primary | ICD-10-CM

## 2021-08-13 PROCEDURE — 99213 OFFICE O/P EST LOW 20 MIN: CPT | Performed by: EMERGENCY MEDICINE

## 2021-08-13 PROCEDURE — 87086 URINE CULTURE/COLONY COUNT: CPT | Performed by: EMERGENCY MEDICINE

## 2021-08-13 PROCEDURE — 87186 SC STD MICRODIL/AGAR DIL: CPT | Performed by: EMERGENCY MEDICINE

## 2021-08-13 PROCEDURE — 87077 CULTURE AEROBIC IDENTIFY: CPT | Performed by: EMERGENCY MEDICINE

## 2021-08-13 RX ORDER — PREDNISONE 10 MG/1
TABLET ORAL
Qty: 27 TABLET | Refills: 0 | Status: SHIPPED | OUTPATIENT
Start: 2021-08-13 | End: 2022-07-19

## 2021-08-13 RX ORDER — CYCLOBENZAPRINE HCL 5 MG
5 TABLET ORAL 3 TIMES DAILY PRN
Qty: 20 TABLET | Refills: 0 | Status: SHIPPED | OUTPATIENT
Start: 2021-08-13 | End: 2022-07-19

## 2021-08-13 RX ORDER — NITROFURANTOIN 25; 75 MG/1; MG/1
100 CAPSULE ORAL 2 TIMES DAILY
Qty: 14 CAPSULE | Refills: 0 | Status: SHIPPED | OUTPATIENT
Start: 2021-08-13 | End: 2021-08-20

## 2021-08-13 NOTE — PROGRESS NOTES
[facility]        NAME: Ute Allen is a 47 y o  female  : 1980    MRN: [id number]  DATE: 2021  TIME: 5:30 PM    Assessment and Plan   Increased urinary frequency [R35 0]  1  Increased urinary frequency  Urine culture    nitrofurantoin (MACROBID) 100 mg capsule   2  Acute left-sided low back pain with left-sided sciatica  predniSONE 10 mg tablet    cyclobenzaprine (FLEXERIL) 5 mg tablet     Patient's back pain does not appear to be related to her urinary symptoms as her pain is more sacroiliac and not at the CVA  Also I do not believe patient's symptoms are related to cauda equina syndrome  Nonetheless I discussed with patient signs and symptoms of cauda equina syndrome and advised her to go to ER if other urinary symptoms developed  Patient Instructions     Patient Instructions     Crucifix stretch (or sometimes also referred to as crocodile or iron cross), as demonstrated on awakening and while still in bed, then activate your deep core muscles by imagining that you are pinning your navel to your spine and hold for reps of 10 seconds as discussed  Core bracing as demonstrated  Squat stretch as demonstrated  Hanging traction as discussed  [employer] up as discussed  Bird Dog Core exercise as discussed  Glute bridge as discussed  Side Bridge as discussed  Use heat 10 - 15 minutes every 2 - 3 hrs especially before stretching, but may use ice for reducing inflammation  Hold any NSAIDs like Ibuprofen (Advil), Naprosyn (Aleve), etc while on steroids like Medrol or Prednisone  If you are diabetic you should adhere strictly to your diabetic diet and monitor blood sugar closely while on prednisone and you should discontinue the prednisone if blood sugar becomes significantly elevated  Back Pain   WHAT YOU NEED TO KNOW:   Back pain is common  You may have back pain and muscle spasms  You may feel sore or stiff on one or both sides of your back   The pain may spread to your lower body  Conditions that affect the spine, joints, or muscles can cause back pain  These may include arthritis, spinal stenosis (narrowing of the spinal column), muscle tension, or breakdown of the spinal discs  DISCHARGE INSTRUCTIONS:   Call your local emergency number (911 in the [address] [facility]) if:   · You have severe back pain with chest pain  · You cannot control your urine or bowel movements  · Your pain becomes so severe that you cannot walk  Return to the emergency department if:   · You have pain, numbness, or weakness in one or both legs  · You have severe back pain, nausea, and vomiting  · You have severe back pain that spreads to your side or genital area  Call your doctor if:   · You have back pain that does not get better with rest and pain medicine  · You have a fever  · You have pain that worsens when you are on your back or when you rest     · You have pain that worsens when you cough or sneeze  · You lose weight without trying  · You have questions or concerns about your condition or care  Medicines: You may need any of the following:  · NSAIDs , such as ibuprofen, help decrease swelling, pain, and fever  This medicine is available with or without a doctor's order  NSAIDs can cause stomach bleeding or kidney problems in certain people  If you take blood thinner medicine, always ask your healthcare provider if NSAIDs are safe for you  Always read the medicine label and follow directions  · Acetaminophen  decreases pain and fever  It is available without a doctor's order  Ask how much to take and how often to take it  Follow directions  Read the labels of all other medicines you are using to see if they also contain acetaminophen, or ask your doctor or pharmacist  Acetaminophen can cause liver damage if not taken correctly  Do not use more than 4 grams (4,000 milligrams) total of acetaminophen in one day       · Muscle relaxers  help decrease muscle spasms and back pain     · Prescription pain medicine  may be given  Ask your healthcare provider how to take this medicine safely  Some prescription pain medicines contain acetaminophen  Do not take other medicines that contain acetaminophen without talking to your healthcare provider  Too much acetaminophen may cause liver damage  Prescription pain medicine may cause constipation  Ask your healthcare provider how to prevent or treat constipation  · Take your medicine as directed  Contact your healthcare provider if you think your medicine is not helping or if you have side effects  Tell him or her if you are allergic to any medicine  Keep a list of the medicines, vitamins, and herbs you take  Include the amounts, and when and why you take them  Bring the list or the pill bottles to follow-up visits  Carry your medicine list with you in case of an emergency  How to manage your back pain:   · Apply ice  on your back for 15 to 20 minutes every hour or as directed  Use an ice pack, or put crushed ice in a plastic bag  Cover it with a towel before you apply it to your skin  Ice helps prevent tissue damage and decreases pain  · Apply heat  on your back for 20 to 30 minutes every 2 hours for as many days as directed  Heat helps decrease pain and muscle spasms  · Stay active  as much as you can without causing more pain  Bed rest could make your back pain worse  Avoid heavy lifting until your pain is gone  · Go to physical therapy as directed  A physical therapist can teach you exercises to help improve movement and strength, and to decrease pain  Follow up with your doctor in 2 weeks, or as directed: You might need to see a specialist  Write down your questions so you remember to ask them during your visits  © Copyright [employer] 2021 Information is for End User's use only and may not be sold, redistributed or otherwise used for commercial purposes   All illustrations and images included in CareNotes® are the copyrighted property of A D A M , Inc  or [address]   The above information is an  only  It is not intended as medical advice for individual conditions or treatments  Talk to your doctor, nurse or pharmacist before following any medical regimen to see if it is safe and effective for you  Urinary Tract Infection in Women, Ambulatory Care   GENERAL INFORMATION:   A urinary tract infection (UTI)  is caused by bacteria that get inside your urinary tract  Most bacteria that enter your urinary tract are expelled when you urinate  If the bacteria stay in your urinary tract, you may get an infection  Your urinary tract includes your kidneys, ureters, bladder, and urethra  Urine is made in your kidneys, and it flows from the ureters to the bladder  Urine leaves the bladder through the urethra  A UTI is more common in your lower urinary tract, which includes your bladder and urethra  Common symptoms include the following:   · Urinating more often or waking from sleep to urinate    · Pain or burning when you urinate    · Pain or pressure in your lower abdomen     · Urine that smells bad    · Blood in your urine    · Leaking urine  Seek immediate care for the following symptoms:   · Urinating very little or not at all    · Vomiting    · Shaking chills with a fever    · Side or back pain that gets worse  Treatment for a UTI  may include medicines to treat a bacterial infection  You may also need medicines to decrease pain and burning, or decrease the urge to urinate often  Prevent a UTI:   · Urinate when you feel the urge  Do not hold your urine  Urinate as soon as you feel you have to  · Drink liquids as directed  Ask how much liquid to drink each day and which liquids are best for you  You may need to drink more fluids than usual to help flush out the bacteria  Do not drink alcohol, caffeine, and citrus juices  These can irritate your bladder and increase your symptoms      · Apply heat on your abdomen for 20 to 30 minutes every 2 hours for as many days as directed  Heat helps decrease discomfort and pressure in your bladder  Follow up with your healthcare provider as directed:  Write down your questions so you remember to ask them during your visits  CARE AGREEMENT:   You have the right to help plan your care  Learn about your health condition and how it may be treated  Discuss treatment options with your caregivers to decide what care you want to receive  You always have the right to refuse treatment  The above information is an  only  It is not intended as medical advice for individual conditions or treatments  Talk to your doctor, nurse or pharmacist before following any medical regimen to see if it is safe and effective for you  © 2014 [address] is for End User's use only and may not be sold, redistributed or otherwise used for commercial purposes  All illustrations and images included in CareNotes® are the copyrighted property of [employer] M [employer] Inc  or Rip Rodriguez  Follow up with PCP in 3-5 days  Proceed to  ER if symptoms worsen  Chief Complaint     Chief Complaint   Patient presents with    Possible UTI     increased frequency, left flank pain  took azo so urine is orange for dip         History of Present Illness       Patient complains of left low back pain since heavy lifting 3 days ago  Pain radiates to her left lateral thigh  She denies prior back problems  She now also notes urinary frequency since yesterday  She was seen here 1 month ago diagnosed with UTI, treated with Keflex  She claims those urinary symptoms completely resolved without treatment  She denies fever or chills  She denies history of kidney stone  She has been taking azo for the past day without any symptomatic relief  Review of Systems   Review of Systems   Constitutional: Negative for chills and fever     Genitourinary: Positive for frequency  Negative for difficulty urinating, dysuria, flank pain, hematuria and urgency  Musculoskeletal: Positive for back pain           Current Medications       Current Outpatient Medications:     cyclobenzaprine (FLEXERIL) 5 mg tablet, Take 1 tablet (5 mg total) by mouth 3 (three) times a day as needed for muscle spasms, Disp: 20 tablet, Rfl: 0    dicyclomine (BENTYL) 10 mg capsule, Take 1 capsule (10 mg total) by mouth 4 (four) times a day (before meals and at bedtime) (Patient not taking: Reported on 8/13/2021), Disp: 30 capsule, Rfl: 0    indomethacin (INDOCIN) 50 mg capsule, Take 1 capsule (50 mg total) by mouth 3 (three) times a day with meals for 5 days, Disp: 15 capsule, Rfl: 0    nitrofurantoin (MACROBID) 100 mg capsule, Take 1 capsule (100 mg total) by mouth 2 (two) times a day for 7 days, Disp: 14 capsule, Rfl: 0    ondansetron (ZOFRAN-ODT) 4 mg disintegrating tablet, Take 1 tablet (4 mg total) by mouth every 6 (six) hours as needed for nausea or vomiting (Patient not taking: Reported on 8/13/2021), Disp: 20 tablet, Rfl: 0    phenazopyridine (PYRIDIUM) 200 mg tablet, Take 1 tablet (200 mg total) by mouth 3 (three) times a day with meals (Patient not taking: Reported on 8/13/2021), Disp: 10 tablet, Rfl: 0    predniSONE 10 mg tablet, Take once daily all days pills on this schedule 6- 6- 5- 4- 3- 2- 1, Disp: 27 tablet, Rfl: 0    Current Allergies     Allergies as of 08/13/2021    (No Known Allergies)            The following portions of the patient's history were reviewed and updated as appropriate: allergies, current medications, past family history, past medical history, past social history, past surgical history and problem list      Past Medical History:   Diagnosis Date    Known health problems: none     Psoriasis        Past Surgical History:   Procedure Laterality Date    NO PAST SURGERIES         Family History   Problem Relation Age of Onset    Diabetes type II Mother without complication     Diabetes type II Father         without complication     Cancer Father     Breast cancer Paternal Grandmother     No Known Problems Paternal Aunt          Medications have been verified  Objective   /74   Pulse 70   Temp 97 7 °F (36 5 °C)   Resp 16   Ht 5' 2" (1 575 m)   Wt 77 1 kg (170 lb)   LMP 07/27/2021   SpO2 98%   BMI 31 09 kg/m²        Physical Exam     Physical Exam  Vitals and nursing note reviewed  Constitutional:       General: She is not in acute distress  Appearance: She is well-developed  Abdominal:      General: Bowel sounds are normal  There is no distension  Palpations: Abdomen is soft  There is no mass  Tenderness: There is abdominal tenderness  There is no guarding or rebound  Comments: Tender at [place] 1 joint on the left, lumbar flexion full but painful, lumbar extension full but painful, left right lumbar rotation full but painful, straight leg raising negative  Skin:     General: Skin is warm and dry  Findings: No rash  Neurological:      Mental Status: She is alert and oriented to person, place, and time  Psychiatric:         Behavior: Behavior normal          Thought Content:  Thought content normal          Judgment: Judgment normal

## 2021-08-13 NOTE — PATIENT INSTRUCTIONS
Crucifix stretch (or sometimes also referred to as crocodile or iron cross), as demonstrated on awakening and while still in bed, then activate your deep core muscles by imagining that you are pinning your navel to your spine and hold for reps of 10 seconds as discussed  Core bracing as demonstrated  Squat stretch as demonstrated  Hanging traction as discussed  [employer] up as discussed  Bird Dog Core exercise as discussed  Glute bridge as discussed  Side Bridge as discussed  Use heat 10 - 15 minutes every 2 - 3 hrs especially before stretching, but may use ice for reducing inflammation  Hold any NSAIDs like Ibuprofen (Advil), Naprosyn (Aleve), etc while on steroids like Medrol or Prednisone  If you are diabetic you should adhere strictly to your diabetic diet and monitor blood sugar closely while on prednisone and you should discontinue the prednisone if blood sugar becomes significantly elevated  Back Pain   WHAT YOU NEED TO KNOW:   Back pain is common  You may have back pain and muscle spasms  You may feel sore or stiff on one or both sides of your back  The pain may spread to your lower body  Conditions that affect the spine, joints, or muscles can cause back pain  These may include arthritis, spinal stenosis (narrowing of the spinal column), muscle tension, or breakdown of the spinal discs  DISCHARGE INSTRUCTIONS:   Call your local emergency number (911 in the [address] [facility]) if:   · You have severe back pain with chest pain  · You cannot control your urine or bowel movements  · Your pain becomes so severe that you cannot walk  Return to the emergency department if:   · You have pain, numbness, or weakness in one or both legs  · You have severe back pain, nausea, and vomiting  · You have severe back pain that spreads to your side or genital area  Call your doctor if:   · You have back pain that does not get better with rest and pain medicine  · You have a fever      · You have pain that worsens when you are on your back or when you rest     · You have pain that worsens when you cough or sneeze  · You lose weight without trying  · You have questions or concerns about your condition or care  Medicines: You may need any of the following:  · NSAIDs , such as ibuprofen, help decrease swelling, pain, and fever  This medicine is available with or without a doctor's order  NSAIDs can cause stomach bleeding or kidney problems in certain people  If you take blood thinner medicine, always ask your healthcare provider if NSAIDs are safe for you  Always read the medicine label and follow directions  · Acetaminophen  decreases pain and fever  It is available without a doctor's order  Ask how much to take and how often to take it  Follow directions  Read the labels of all other medicines you are using to see if they also contain acetaminophen, or ask your doctor or pharmacist  Acetaminophen can cause liver damage if not taken correctly  Do not use more than 4 grams (4,000 milligrams) total of acetaminophen in one day  · Muscle relaxers  help decrease muscle spasms and back pain  · Prescription pain medicine  may be given  Ask your healthcare provider how to take this medicine safely  Some prescription pain medicines contain acetaminophen  Do not take other medicines that contain acetaminophen without talking to your healthcare provider  Too much acetaminophen may cause liver damage  Prescription pain medicine may cause constipation  Ask your healthcare provider how to prevent or treat constipation  · Take your medicine as directed  Contact your healthcare provider if you think your medicine is not helping or if you have side effects  Tell him or her if you are allergic to any medicine  Keep a list of the medicines, vitamins, and herbs you take  Include the amounts, and when and why you take them  Bring the list or the pill bottles to follow-up visits   Carry your medicine list with you in case of an emergency  How to manage your back pain:   · Apply ice  on your back for 15 to 20 minutes every hour or as directed  Use an ice pack, or put crushed ice in a plastic bag  Cover it with a towel before you apply it to your skin  Ice helps prevent tissue damage and decreases pain  · Apply heat  on your back for 20 to 30 minutes every 2 hours for as many days as directed  Heat helps decrease pain and muscle spasms  · Stay active  as much as you can without causing more pain  Bed rest could make your back pain worse  Avoid heavy lifting until your pain is gone  · Go to physical therapy as directed  A physical therapist can teach you exercises to help improve movement and strength, and to decrease pain  Follow up with your doctor in 2 weeks, or as directed: You might need to see a specialist  Write down your questions so you remember to ask them during your visits  © [employer] 2021 Information is for End User's use only and may not be sold, redistributed or otherwise used for commercial purposes  All illustrations and images included in CareNotes® are the copyrighted property of [employer] M [employer] Inc  or [facility] Stacey Tavares   The above information is an  only  It is not intended as medical advice for individual conditions or treatments  Talk to your doctor, nurse or pharmacist before following any medical regimen to see if it is safe and effective for you  Urinary Tract Infection in Women, Ambulatory Care   GENERAL INFORMATION:   A urinary tract infection (UTI)  is caused by bacteria that get inside your urinary tract  Most bacteria that enter your urinary tract are expelled when you urinate  If the bacteria stay in your urinary tract, you may get an infection  Your urinary tract includes your kidneys, ureters, bladder, and urethra  Urine is made in your kidneys, and it flows from the ureters to the bladder  Urine leaves the bladder through the urethra   A UTI is more common in your lower urinary tract, which includes your bladder and urethra  Common symptoms include the following:   · Urinating more often or waking from sleep to urinate    · Pain or burning when you urinate    · Pain or pressure in your lower abdomen     · Urine that smells bad    · Blood in your urine    · Leaking urine  Seek immediate care for the following symptoms:   · Urinating very little or not at all    · Vomiting    · Shaking chills with a fever    · Side or back pain that gets worse  Treatment for a UTI  may include medicines to treat a bacterial infection  You may also need medicines to decrease pain and burning, or decrease the urge to urinate often  Prevent a UTI:   · Urinate when you feel the urge  Do not hold your urine  Urinate as soon as you feel you have to  · Drink liquids as directed  Ask how much liquid to drink each day and which liquids are best for you  You may need to drink more fluids than usual to help flush out the bacteria  Do not drink alcohol, caffeine, and citrus juices  These can irritate your bladder and increase your symptoms  · Apply heat  on your abdomen for 20 to 30 minutes every 2 hours for as many days as directed  Heat helps decrease discomfort and pressure in your bladder  Follow up with your healthcare provider as directed:  Write down your questions so you remember to ask them during your visits  CARE AGREEMENT:   You have the right to help plan your care  Learn about your health condition and how it may be treated  Discuss treatment options with your caregivers to decide what care you want to receive  You always have the right to refuse treatment  The above information is an  only  It is not intended as medical advice for individual conditions or treatments  Talk to your doctor, nurse or pharmacist before following any medical regimen to see if it is safe and effective for you    © 2014 [address] is for End User's use only and may not be sold, redistributed or otherwise used for commercial purposes  All illustrations and images included in CareNotes® are the copyrighted property of [employer]  or [employer]ip Rodriguez

## 2021-08-15 LAB — BACTERIA UR CULT: ABNORMAL

## 2022-04-18 ENCOUNTER — VBI (OUTPATIENT)
Dept: ADMINISTRATIVE | Facility: OTHER | Age: 42
End: 2022-04-18

## 2022-07-19 ENCOUNTER — OFFICE VISIT (OUTPATIENT)
Dept: FAMILY MEDICINE CLINIC | Facility: CLINIC | Age: 42
End: 2022-07-19
Payer: COMMERCIAL

## 2022-07-19 VITALS
OXYGEN SATURATION: 98 % | SYSTOLIC BLOOD PRESSURE: 118 MMHG | BODY MASS INDEX: 33.23 KG/M2 | TEMPERATURE: 97.8 F | HEART RATE: 73 BPM | WEIGHT: 180.6 LBS | HEIGHT: 62 IN | DIASTOLIC BLOOD PRESSURE: 76 MMHG

## 2022-07-19 DIAGNOSIS — Z12.4 SCREENING FOR CERVICAL CANCER: ICD-10-CM

## 2022-07-19 DIAGNOSIS — E66.09 CLASS 1 OBESITY DUE TO EXCESS CALORIES WITHOUT SERIOUS COMORBIDITY WITH BODY MASS INDEX (BMI) OF 33.0 TO 33.9 IN ADULT: ICD-10-CM

## 2022-07-19 DIAGNOSIS — G43.009 MIGRAINE WITHOUT AURA AND WITHOUT STATUS MIGRAINOSUS, NOT INTRACTABLE: Primary | ICD-10-CM

## 2022-07-19 DIAGNOSIS — Z11.59 NEED FOR HEPATITIS C SCREENING TEST: ICD-10-CM

## 2022-07-19 PROBLEM — E66.811 CLASS 1 OBESITY DUE TO EXCESS CALORIES WITH BODY MASS INDEX (BMI) OF 33.0 TO 33.9 IN ADULT: Status: ACTIVE | Noted: 2022-07-19

## 2022-07-19 PROBLEM — U07.1 2019 NOVEL CORONAVIRUS DISEASE (COVID-19): Status: RESOLVED | Noted: 2020-12-28 | Resolved: 2022-07-19

## 2022-07-19 PROCEDURE — 99395 PREV VISIT EST AGE 18-39: CPT | Performed by: INTERNAL MEDICINE

## 2022-07-19 PROCEDURE — 3725F SCREEN DEPRESSION PERFORMED: CPT | Performed by: INTERNAL MEDICINE

## 2022-07-19 RX ORDER — SUMATRIPTAN 50 MG/1
50 TABLET, FILM COATED ORAL ONCE AS NEEDED
Qty: 9 TABLET | Refills: 5 | Status: SHIPPED | OUTPATIENT
Start: 2022-07-19

## 2022-07-19 NOTE — PROGRESS NOTES
Assessment/Plan:    Problem List Items Addressed This Visit        Cardiovascular and Mediastinum    Migraine without aura and without status migrainosus, not intractable - Primary     Will renew her prescription for sumatriptan  Since she only seems to be having one migraine per month, will hold off on any prophylactic medications  Relevant Medications    SUMAtriptan (Imitrex) 50 mg tablet       Other    Class 1 obesity due to excess calories with body mass index (BMI) of 33 0 to 33 9 in adult     Start tracking nutrition using [employer]   Limit intake to less than 1500 calories per day  If this is not successful, we can refer to nutritionist            Other Visit Diagnoses     Need for hepatitis C screening test        Relevant Orders    Hepatitis C Antibody ([facility])    Screening for cervical cancer        Relevant Orders    Ambulatory referral to Obstetrics / Gynecology        BMI Counseling: Body mass index is 33 03 kg/m²  The BMI is above normal  Nutrition recommendations include moderation in carbohydrate intake  Rationale for BMI follow-up plan is due to patient being overweight or obese  Depression Screening and Follow-up Plan: Patient was screened for depression during today's encounter  They screened negative with a PHQ-2 score of 0  Chief Complaint     Physical Exam; Care Gap Request          Patient ID: Carina Bobby is a 46 y o  female who returns for a preventive visit  She tends to get migraines right before she gets her period  The headache may last 2-3 days and improves when she gets her period  She has gained 10 pound since her last visit  She does drink beverages that contain sugar and enjoys chocolate for dessert  She does not have a habit of exercising        Objective:    /76 (BP Location: Left arm, Patient Position: Sitting, Cuff Size: Standard)   Pulse 73   Temp 97 8 °F (36 6 °C)   Ht 5' 2" (1 575 m)   Wt 81 9 kg (180 lb 9 6 oz)   SpO2 98%   BMI 33 03 kg/m²     Wt Readings from Last 6 Encounters:   07/19/22 81 9 kg (180 lb 9 6 oz)   08/13/21 77 1 kg (170 lb)   07/13/21 77 1 kg (170 lb)   04/14/21 77 1 kg (170 lb)   12/21/20 77 1 kg (170 lb)   05/26/20 79 9 kg (176 lb 3 2 oz)         Physical Exam  Vitals reviewed  Constitutional:       General: She is not in acute distress  Appearance: Normal appearance  She is well-developed  She is not ill-appearing  HENT:      Head: Normocephalic and atraumatic  Right Ear: Tympanic membrane and external ear normal       Left Ear: Tympanic membrane and external ear normal       Nose: Nose normal       Mouth/Throat:      Mouth: Mucous membranes are moist       Pharynx: Oropharynx is clear  Eyes:      General: No scleral icterus  Extraocular Movements: Extraocular movements intact  Pupils: Pupils are equal, round, and reactive to light  Neck:      Thyroid: No thyroid mass or thyromegaly  Vascular: No JVD  Cardiovascular:      Rate and Rhythm: Normal rate and regular rhythm  Heart sounds: Normal heart sounds  No murmur heard  No friction rub  No gallop  Pulmonary:      Breath sounds: Normal breath sounds  Abdominal:      General: Bowel sounds are normal  There is no distension  Palpations: Abdomen is soft  There is no mass  Musculoskeletal:         General: No swelling  Skin:     Comments: Skin tag in the lumbar region  Neurological:      Mental Status: She is alert     Psychiatric:         Mood and Affect: Mood normal

## 2022-07-19 NOTE — ASSESSMENT & PLAN NOTE
Start tracking nutrition using [employer]   Limit intake to less than 1500 calories per day    If this is not successful, we can refer to nutritionist

## 2022-07-19 NOTE — ASSESSMENT & PLAN NOTE
Will renew her prescription for sumatriptan  Since she only seems to be having one migraine per month, will hold off on any prophylactic medications

## 2022-07-25 ENCOUNTER — APPOINTMENT (OUTPATIENT)
Dept: LAB | Facility: CLINIC | Age: 42
End: 2022-07-25
Payer: COMMERCIAL

## 2022-07-25 DIAGNOSIS — Z11.59 NEED FOR HEPATITIS C SCREENING TEST: ICD-10-CM

## 2022-07-25 LAB — HCV AB SER QL: NORMAL

## 2022-07-25 PROCEDURE — 86803 HEPATITIS C AB TEST: CPT

## 2022-07-25 PROCEDURE — 36415 COLL VENOUS BLD VENIPUNCTURE: CPT

## 2022-08-10 ENCOUNTER — OFFICE VISIT (OUTPATIENT)
Dept: URGENT CARE | Facility: CLINIC | Age: 42
End: 2022-08-10
Payer: COMMERCIAL

## 2022-08-10 VITALS
DIASTOLIC BLOOD PRESSURE: 80 MMHG | SYSTOLIC BLOOD PRESSURE: 139 MMHG | HEIGHT: 63 IN | HEART RATE: 76 BPM | BODY MASS INDEX: 31.89 KG/M2 | OXYGEN SATURATION: 99 % | TEMPERATURE: 99.2 F | WEIGHT: 180 LBS

## 2022-08-10 DIAGNOSIS — N30.00 ACUTE CYSTITIS WITHOUT HEMATURIA: Primary | ICD-10-CM

## 2022-08-10 LAB
SL AMB  POCT GLUCOSE, UA: NEGATIVE
SL AMB LEUKOCYTE ESTERASE,UA: ABNORMAL
SL AMB POCT BILIRUBIN,UA: NEGATIVE
SL AMB POCT BLOOD,UA: ABNORMAL
SL AMB POCT CLARITY,UA: ABNORMAL
SL AMB POCT COLOR,UA: YELLOW
SL AMB POCT KETONES,UA: NEGATIVE
SL AMB POCT NITRITE,UA: NEGATIVE
SL AMB POCT PH,UA: 7.5
SL AMB POCT SPECIFIC GRAVITY,UA: 1.01
SL AMB POCT URINE HCG: NEGATIVE
SL AMB POCT URINE PROTEIN: NEGATIVE
SL AMB POCT UROBILINOGEN: NORMAL

## 2022-08-10 PROCEDURE — 87077 CULTURE AEROBIC IDENTIFY: CPT | Performed by: EMERGENCY MEDICINE

## 2022-08-10 PROCEDURE — 81002 URINALYSIS NONAUTO W/O SCOPE: CPT | Performed by: EMERGENCY MEDICINE

## 2022-08-10 PROCEDURE — 87086 URINE CULTURE/COLONY COUNT: CPT | Performed by: EMERGENCY MEDICINE

## 2022-08-10 PROCEDURE — 87186 SC STD MICRODIL/AGAR DIL: CPT | Performed by: EMERGENCY MEDICINE

## 2022-08-10 PROCEDURE — 99213 OFFICE O/P EST LOW 20 MIN: CPT | Performed by: EMERGENCY MEDICINE

## 2022-08-10 PROCEDURE — 81025 URINE PREGNANCY TEST: CPT | Performed by: EMERGENCY MEDICINE

## 2022-08-10 RX ORDER — PHENAZOPYRIDINE HYDROCHLORIDE 200 MG/1
200 TABLET, FILM COATED ORAL
Qty: 10 TABLET | Refills: 0 | Status: SHIPPED | OUTPATIENT
Start: 2022-08-10

## 2022-08-10 RX ORDER — NITROFURANTOIN 25; 75 MG/1; MG/1
100 CAPSULE ORAL 2 TIMES DAILY
Qty: 14 CAPSULE | Refills: 0 | Status: SHIPPED | OUTPATIENT
Start: 2022-08-10 | End: 2022-08-17

## 2022-08-10 NOTE — PROGRESS NOTES
[facility]        NAME: Garrick Mccarthy is a 46 y o  female  : 1980    MRN: [id number]  DATE: August 10, 2022  TIME: 7:33 PM    Assessment and Plan   Dysuria [R30 0]  1  Dysuria  POCT urine dip    POCT urine HCG    Urine culture         Patient Instructions     There are no Patient Instructions on file for this visit  Follow up with PCP in 3-5 days  Proceed to  ER if symptoms worsen  Chief Complaint   No chief complaint on file  History of Present Illness       Patient complains of burning on urination with increased frequency for the past 2 days  Review of Systems   Review of Systems   Constitutional: Negative for chills and fever  Genitourinary: Positive for dysuria, frequency and urgency  Negative for difficulty urinating, flank pain and hematuria  Musculoskeletal: Negative for back pain  Current Medications       Current Outpatient Medications:     SUMAtriptan (Imitrex) 50 mg tablet, Take 1 tablet (50 mg total) by mouth once as needed for migraine for up to 1 dose, Disp: 9 tablet, Rfl: 5    Current Allergies     Allergies as of 08/10/2022    (No Known Allergies)            The following portions of the patient's history were reviewed and updated as appropriate: allergies, current medications, past family history, past medical history, past social history, past surgical history and problem list      Past Medical History:   Diagnosis Date     novel coronavirus disease (COVID-19) 2020    Known health problems: none     Psoriasis        Past Surgical History:   Procedure Laterality Date    NO PAST SURGERIES         Family History   Problem Relation Age of Onset    Diabetes type II Mother         without complication     Diabetes type II Father         without complication     Cancer Father     Breast cancer Paternal Grandmother     No Known Problems Paternal Aunt          Medications have been verified          Objective   There were no vitals taken for this visit  Physical Exam     Physical Exam  Vitals and nursing note reviewed  Constitutional:       General: She is not in acute distress  Appearance: She is well-developed  Abdominal:      General: Bowel sounds are normal  There is no distension  Palpations: Abdomen is soft  There is no mass  Tenderness: There is no abdominal tenderness  There is no guarding or rebound  Skin:     General: Skin is warm and dry  Findings: No rash  Neurological:      Mental Status: She is alert and oriented to person, place, and time  Psychiatric:         Behavior: Behavior normal          Thought Content:  Thought content normal          Judgment: Judgment normal

## 2022-08-10 NOTE — PATIENT INSTRUCTIONS
Urinary Tract Infection in Women   WHAT YOU NEED TO KNOW:   A urinary tract infection (UTI) is caused by bacteria that get inside your urinary tract  Most bacteria that enter your urinary tract come out when you urinate  If the bacteria stay in your urinary tract, you may get an infection  Your urinary tract includes your kidneys, ureters, bladder, and urethra  Urine is made in your kidneys, and it flows from the ureters to the bladder  Urine leaves the bladder through the urethra  A UTI is more common in your lower urinary tract, which includes your bladder and urethra  DISCHARGE INSTRUCTIONS:   Return to the emergency department if:   You are urinating very little or not at all  You have a high fever with shaking chills  You have side or back pain that gets worse  Call your doctor if:   You have a fever  You do not feel better after 2 days of taking antibiotics  You are vomiting  You have questions or concerns about your condition or care  Medicines:   Antibiotics  help fight a bacterial infection  If you have UTIs often (called recurrent UTIs), you may be given antibiotics to take regularly  You will be given directions for when and how to use antibiotics  The goal is to prevent UTIs but not cause antibiotic resistance by using antibiotics too often  Medicines  may be given to decrease pain and burning when you urinate  They will also help decrease the feeling that you need to urinate often  These medicines will make your urine orange or red  Take your medicine as directed  Contact your healthcare provider if you think your medicine is not helping or if you have side effects  Tell him or her if you are allergic to any medicine  Keep a list of the medicines, vitamins, and herbs you take  Include the amounts, and when and why you take them  Bring the list or the pill bottles to follow-up visits  Carry your medicine list with you in case of an emergency      Follow up with your doctor as directed:  Write down your questions so you remember to ask them during your visits  Prevent another UTI:   Empty your bladder often  Urinate and empty your bladder as soon as you feel the need  Do not hold your urine for long periods of time  Wipe from front to back after you urinate or have a bowel movement  This will help prevent germs from getting into your urinary tract through your urethra  Drink liquids as directed  Ask how much liquid to drink each day and which liquids are best for you  You may need to drink more liquids than usual to help flush out the bacteria  Do not drink alcohol, caffeine, or citrus juices  These can irritate your bladder and increase your symptoms  Your healthcare provider may recommend cranberry juice to help prevent a UTI  Urinate after you have sex  This can help flush out bacteria passed during sex  Do not douche or use feminine deodorants  These can change the chemical balance in your vagina  Change sanitary pads or tampons often  This will help prevent germs from getting into your urinary tract  Talk to your healthcare provider about your birth control method  You may need to change your method if it is increasing your risk for UTIs  Wear cotton underwear and clothes that are loose  Tight pants and nylon underwear can trap moisture and cause bacteria to grow  Vaginal estrogen may be recommended  This medicine helps prevent UTIs in women who have gone through menopause or are in torie-menopause  Do pelvic muscle exercises often  Pelvic muscle exercises may help you start and stop urinating  Strong pelvic muscles may help you empty your bladder easier  Squeeze these muscles tightly for 5 seconds like you are trying to hold back urine  Then relax for 5 seconds  Gradually work up to squeezing for 10 seconds  Do 3 sets of 15 repetitions a day, or as directed      © Copyright [employer] 2022 Information is for End User's use only and may not be sold, redistributed or otherwise used for commercial purposes  All illustrations and images included in CareNotes® are the copyrighted property of A D [employer]  or [employer]  The above information is an  only  It is not intended as medical advice for individual conditions or treatments  Talk to your doctor, nurse or pharmacist before following any medical regimen to see if it is safe and effective for you

## 2022-08-12 LAB — BACTERIA UR CULT: ABNORMAL

## 2022-10-28 ENCOUNTER — RA CDI HCC (OUTPATIENT)
Dept: OTHER | Facility: HOSPITAL | Age: 42
End: 2022-10-28

## 2022-10-28 NOTE — PROGRESS NOTES
[facility]  coding opportunities       Chart reviewed, no opportunity found: CHART REVIEWED, NO OPPORTUNITY FOUND        Patients Insurance        Commercial Insurance: [employer]

## 2022-10-31 ENCOUNTER — OFFICE VISIT (OUTPATIENT)
Dept: FAMILY MEDICINE CLINIC | Facility: CLINIC | Age: 42
End: 2022-10-31

## 2022-10-31 VITALS
WEIGHT: 171.8 LBS | DIASTOLIC BLOOD PRESSURE: 70 MMHG | HEART RATE: 85 BPM | SYSTOLIC BLOOD PRESSURE: 118 MMHG | BODY MASS INDEX: 30.44 KG/M2 | TEMPERATURE: 97.8 F | OXYGEN SATURATION: 98 % | HEIGHT: 63 IN

## 2022-10-31 DIAGNOSIS — Z01.419 ENCOUNTER FOR ANNUAL ROUTINE GYNECOLOGICAL EXAMINATION: ICD-10-CM

## 2022-10-31 DIAGNOSIS — E66.09 CLASS 1 OBESITY DUE TO EXCESS CALORIES WITHOUT SERIOUS COMORBIDITY WITH BODY MASS INDEX (BMI) OF 33.0 TO 33.9 IN ADULT: ICD-10-CM

## 2022-10-31 DIAGNOSIS — Z23 NEEDS FLU SHOT: Primary | ICD-10-CM

## 2022-10-31 DIAGNOSIS — Z12.31 ENCOUNTER FOR SCREENING MAMMOGRAM FOR BREAST CANCER: ICD-10-CM

## 2022-10-31 NOTE — ASSESSMENT & PLAN NOTE
Good success with her weight loss efforts so far  Continue to limit carbohydrates but I again recommend that she get [employer] and start tracking her nutrition  She should also increase her intake of fruits and vegetables

## 2022-10-31 NOTE — PROGRESS NOTES
Assessment/Plan:    Class 1 obesity due to excess calories with body mass index (BMI) of 33 0 to 33 9 in adult  Good success with her weight loss efforts so far  Continue to limit carbohydrates but I again recommend that she get [employer] and start tracking her nutrition  She should also increase her intake of fruits and vegetables  BMI Counseling: Body mass index is 30 43 kg/m²  The BMI is above normal  Nutrition recommendations include limiting drinks that contain sugar and moderation in carbohydrate intake  Rationale for BMI follow-up plan is due to patient being overweight or obese  Chief Complaint     Follow-up          Patient ID: Angela Gamble is a 38 y o  female who returns for routine follow up  She cut back on her intake of beverages with sugar and has been limiting her portions  She also tries to limit her carbs  Objective:    /70 (BP Location: Right arm, Patient Position: Sitting)   Pulse 85   Temp 97 8 °F (36 6 °C)   Ht 5' 3" (1 6 m)   Wt 77 9 kg (171 lb 12 8 oz)   SpO2 98%   BMI 30 43 kg/m²     Wt Readings from Last 3 Encounters:   10/31/22 77 9 kg (171 lb 12 8 oz)   08/10/22 81 6 kg (180 lb)   07/19/22 81 9 kg (180 lb 9 6 oz)         Physical Exam    General:  Well-developed, well-nourished, in no acute distress  Exam otherwise deferred today

## 2022-11-21 ENCOUNTER — HOSPITAL ENCOUNTER (OUTPATIENT)
Dept: RADIOLOGY | Facility: CLINIC | Age: 42
Discharge: HOME | End: 2022-11-21

## 2022-11-21 VITALS — HEIGHT: 63 IN | BODY MASS INDEX: 30.44 KG/M2 | WEIGHT: 171.8 LBS

## 2022-11-21 DIAGNOSIS — Z12.31 ENCOUNTER FOR SCREENING MAMMOGRAM FOR BREAST CANCER: ICD-10-CM

## 2023-04-28 DIAGNOSIS — G43.009 MIGRAINE WITHOUT AURA AND WITHOUT STATUS MIGRAINOSUS, NOT INTRACTABLE: ICD-10-CM

## 2023-04-28 RX ORDER — SUMATRIPTAN 50 MG/1
TABLET, FILM COATED ORAL
Qty: 9 TABLET | Refills: 5 | Status: SHIPPED | OUTPATIENT
Start: 2023-04-28

## 2023-05-03 NOTE — PROGRESS NOTES
Assessment        Diagnoses and all orders for this visit:    Encntr for gyn exam (general) (routine) w/o abn findings    Cervical cancer screening  -     Liquid-based pap, screening    Encounter for screening mammogram for breast cancer  -     Mammo screening bilateral w 3d & cad; Future    At risk for breast cancer  -     Ambulatory Referral to Surgical Oncology; Future    Family history of breast cancer  -     Ambulatory Referral to Surgical Oncology; Future             Plan      All questions answered  Await pap smear results  Contraception: coitus interruptus  Educational material distributed  Follow up in 1 year  Follow up as needed  Mammogram     Declines genetic oncology referral for now  Surgical oncology referral for supplemental breast screening    Gaurav Bal is a 38 y o  female who presents for annual exam       Chief Complaint   Patient presents with   [facility] Patient Visit     Yearly  She is sexually active  Declines contraception    STD testing declined    Last Pap: 19 previously saw Dr Radha Dobbs  Last mammogram: 22  Colorectal cancer screening: nonw      HPV vaccine completed:no  Current contraception: none/withdrawal  History of abnormal Pap smear: no  History of abnormal mammogram: no  Family history of uterine or ovarian cancer: no  Family history of breast cancer: PGM unrecalled   Family history of colon cancer: no        Menstrual History:  OB History             Para        Term   0            AB        Living           SAB        IAB        Ectopic        Multiple        Live Births                    Menarche age: 17  Patient's last menstrual period was 2023 (approximate)               Past Medical History:   Diagnosis Date     novel coronavirus disease (COVID-19) 2020    Known health problems: none     Psoriasis      Past Surgical History:   Procedure Laterality Date    NO PAST SURGERIES       Family History "  Problem Relation Age of Onset    Diabetes Mother     Diabetes type II Mother         without complication     Diabetes type II Father         without complication     Cancer Father     No Known Problems Maternal Grandmother     No Known Problems Maternal Grandfather     Breast cancer Paternal Grandmother     No Known Problems Paternal Aunt     Breast cancer additional onset Neg Hx     BRCA2 Positive Neg Hx     BRCA2 Negative Neg Hx     BRCA1 Positive Neg Hx     BRCA1 Negative Neg Hx     BRCA 1/2 Neg Hx     Endometrial cancer Neg Hx     Colon cancer Neg Hx     Ovarian cancer Neg Hx        Social History     Tobacco Use    Smoking status: Never    Smokeless tobacco: Never   Vaping Use    Vaping Use: Never used   Substance Use Topics    Alcohol use: Yes    Drug use: Never          Current Outpatient Medications:     SUMAtriptan (IMITREX) 50 mg tablet, TAKE 1 TABLET BY MOUTH ONCE AS NEEDED FOR MIGRAINE FOR UP TO 1 DOSE , Disp: 9 tablet, Rfl: 5    phenazopyridine (PYRIDIUM) 200 mg tablet, Take 1 tablet (200 mg total) by mouth 3 (three) times a day with meals (Patient not taking: Reported on 10/31/2022), Disp: 10 tablet, Rfl: 0    No Known Allergies        Review of Systems   Constitutional: Negative  HENT: Negative  Eyes: Negative  Respiratory: Negative  Cardiovascular: Negative  Gastrointestinal: Negative  Endocrine: Negative  Genitourinary:        As noted in HPI   Musculoskeletal: Negative  Skin: Negative  Allergic/Immunologic: Negative  Neurological: Negative  Hematological: Negative  Psychiatric/Behavioral: Negative  /68 (BP Location: Right arm, Patient Position: Sitting, Cuff Size: Adult)   Pulse 98   Ht 5' 3\" (1 6 m)   Wt 73 kg (161 lb)   LMP 04/09/2023 (Approximate)   BMI 28 52 kg/m²         Physical Exam  Constitutional:       Appearance: She is well-developed     Genitourinary:      Vulva, bladder and rectum normal       No lesions " in the vagina  Right Labia: No rash, tenderness, lesions, skin changes or Bartholin's cyst      Left Labia: No tenderness, lesions, skin changes, Bartholin's cyst or rash  No inguinal adenopathy present in the right or left side  No vaginal discharge, tenderness or bleeding  No vaginal prolapse present  No vaginal atrophy present  Right Adnexa: not tender, not full and no mass present  Left Adnexa: not tender, not full and no mass present  No cervical motion tenderness, friability, lesion or polyp  Uterus is not enlarged or tender  Pelvic exam was performed with patient in the lithotomy position  Rectum:      No external hemorrhoid  Breasts:     Right: No mass, nipple discharge, skin change or tenderness  Left: No mass, nipple discharge, skin change or tenderness  HENT:      Head: Normocephalic  Nose: Nose normal    Eyes:      Conjunctiva/sclera: Conjunctivae normal    Neck:      Thyroid: No thyromegaly  Cardiovascular:      Rate and Rhythm: Normal rate  Pulmonary:      Effort: Pulmonary effort is normal    Abdominal:      General: There is no distension  Palpations: Abdomen is soft  There is no mass  Tenderness: There is no abdominal tenderness  There is no guarding or rebound  Musculoskeletal:         General: No tenderness  Cervical back: Neck supple  No muscular tenderness  Lymphadenopathy:      Cervical: No cervical adenopathy  Lower Body: No right inguinal adenopathy  No left inguinal adenopathy  Neurological:      Mental Status: She is alert and oriented to person, place, and time  Skin:     General: Skin is warm and dry     Psychiatric:         Mood and Affect: Mood normal          Behavior: Behavior normal              Future Appointments   Date Time Provider Roger Ramachandran   11/24/2023  2:00 PM [facility]

## 2023-05-03 NOTE — PATIENT INSTRUCTIONS
Wellness Visit for Adults   AMBULATORY CARE:   A wellness visit  is when you see your healthcare provider to get screened for health problems  Your healthcare provider will also give you advice on how to stay healthy  Write down your questions so you remember to ask them  Ask your healthcare provider how often you should have a wellness visit  What happens at a wellness visit:  Your healthcare provider will ask about your health, and your family history of health problems  This includes high blood pressure, heart disease, and cancer  He or she will ask if you have symptoms that concern you, if you smoke, and about your mood  You may also be asked about your intake of medicines, supplements, food, and alcohol  Any of the following may be done: Your weight  will be checked  Your height may also be checked so your body mass index (BMI) can be calculated  Your BMI shows if you are at a healthy weight  Your blood pressure  and heart rate will be checked  Your temperature may also be checked  Blood and urine tests  may be done  Blood tests may be done to check your cholesterol levels  Abnormal cholesterol levels increase your risk for heart disease and stroke  You may also need a blood or urine test to check for diabetes if you are at increased risk  Urine tests may be done to look for signs of an infection or kidney disease  A physical exam  includes checking your heartbeat and lungs with a stethoscope  Your healthcare provider may also check your skin to look for sun damage  Screening tests  may be recommended  A screening test is done to check for diseases that may not cause symptoms  The screening tests you may need depend on your age, gender, family history, and lifestyle habits  For example, colorectal screening may be recommended if you are 48years old or older  Screening tests you need if you are a woman:   A Pap smear  is used to screen for cervical cancer   Pap smears are usually done every 3 to 5 years depending on your age  You may need them more often if you have had abnormal Pap smear test results in the past  Ask your healthcare provider how often you should have a Pap smear  A mammogram  is an x-ray of your breasts to screen for breast cancer  Experts recommend mammograms every 2 years starting at age 48 years  You may need a mammogram at age 52 years or younger if you have an increased risk for breast cancer  Talk to your healthcare provider about when you should start having mammograms and how often you need them  Vaccines you may need:   Get an influenza vaccine  every year  The influenza vaccine protects you from the flu  Several types of viruses cause the flu  The viruses change over time, so new vaccines are made each year  Get a tetanus-diphtheria (Td) booster vaccine  every 10 years  This vaccine protects you against tetanus and diphtheria  Tetanus is a severe infection that may cause painful muscle spasms and lockjaw  Diphtheria is a severe bacterial infection that causes a thick covering in the back of your mouth and throat  Get a human papillomavirus (HPV) vaccine  if you are female and aged 23 to 32 or male 23 to 24 and never received it  This vaccine protects you from HPV infection  HPV is the most common infection spread by sexual contact  HPV may also cause vaginal, penile, and anal cancers  Get a pneumococcal vaccine  if you are aged 72 years or older  The pneumococcal vaccine is an injection given to protect you from pneumococcal disease  Pneumococcal disease is an infection caused by pneumococcal bacteria  The infection may cause pneumonia, meningitis, or an ear infection  Get a shingles vaccine  if you are 60 or older, even if you have had shingles before  The shingles vaccine is an injection to protect you from the varicella-zoster virus  This is the same virus that causes chickenpox   Shingles is a painful rash that develops in people who had chickenpox or have been exposed to the virus  How to eat healthy:  My Plate is a model for planning healthy meals  It shows the types and amounts of foods that should go on your plate  Fruits and vegetables make up about half of your plate, and grains and protein make up the other half  A serving of dairy is included on the side of your plate  The amount of calories and serving sizes you need depends on your age, gender, weight, and height  Examples of healthy foods are listed below:  Eat a variety of vegetables  such as dark green, red, and orange vegetables  You can also include canned vegetables low in sodium (salt) and frozen vegetables without added butter or sauces  Eat a variety of fresh fruits , canned fruit in 100% juice, frozen fruit, and dried fruit  Include whole grains  At least half of the grains you eat should be whole grains  Examples include whole-wheat bread, wheat pasta, brown rice, and whole-grain cereals such as oatmeal     Eat a variety of protein foods such as seafood (fish and shellfish), lean meat, and poultry without skin (turkey and chicken)  Examples of lean meats include pork leg, shoulder, or tenderloin, and beef round, sirloin, tenderloin, and extra lean ground beef  Other protein foods include eggs and egg substitutes, beans, peas, soy products, nuts, and seeds  Choose low-fat dairy products such as skim or 1% milk or low-fat yogurt, cheese, and cottage cheese  Limit unhealthy fats  such as butter, hard margarine, and shortening  Exercise:  Exercise at least 30 minutes per day on most days of the week  Some examples of exercise include walking, biking, dancing, and swimming  You can also fit in more physical activity by taking the stairs instead of the elevator or parking farther away from stores  Include muscle strengthening activities 2 days each week  Regular exercise provides many health benefits   It helps you manage your weight, and decreases your risk for type 2 diabetes, heart disease, stroke, and high blood pressure  Exercise can also help improve your mood  Ask your healthcare provider about the best exercise plan for you  General health and safety guidelines:   Do not smoke  Nicotine and other chemicals in cigarettes and cigars can cause lung damage  Ask your healthcare provider for information if you currently smoke and need help to quit  E-cigarettes or smokeless tobacco still contain nicotine  Talk to your healthcare provider before you use these products  Limit alcohol  A drink of alcohol is 12 ounces of beer, 5 ounces of wine, or 1½ ounces of liquor  Lose weight, if needed  Being overweight increases your risk of certain health conditions  These include heart disease, high blood pressure, type 2 diabetes, and certain types of cancer  Protect your skin  Do not sunbathe or use tanning beds  Use sunscreen with a SPF 15 or higher  Apply sunscreen at least 15 minutes before you go outside  Reapply sunscreen every 2 hours  Wear protective clothing, hats, and sunglasses when you are outside  Drive safely  Always wear your seatbelt  Make sure everyone in your car wears a seatbelt  A seatbelt can save your life if you are in an accident  Do not use your cell phone when you are driving  This could distract you and cause an accident  Pull over if you need to make a call or send a text message  Practice safe sex  Use latex condoms if are sexually active and have more than one partner  Your healthcare provider may recommend screening tests for sexually transmitted infections (STIs)  Wear helmets, lifejackets, and protective gear  Always wear a helmet when you ride a bike or motorcycle, go skiing, or play sports that could cause a head injury  Wear protective equipment when you play sports  Wear a lifejacket when you are on a boat or doing water sports      © Copyright Eugenia Cano 2022 Information is for End User's use only and may not be sold, redistributed or otherwise used for commercial purposes  The above information is an  only  It is not intended as medical advice for individual conditions or treatments  Talk to your doctor, nurse or pharmacist before following any medical regimen to see if it is safe and effective for you

## 2023-05-04 ENCOUNTER — OFFICE VISIT (OUTPATIENT)
Dept: OBGYN CLINIC | Facility: CLINIC | Age: 43
End: 2023-05-04

## 2023-05-04 VITALS
WEIGHT: 161 LBS | DIASTOLIC BLOOD PRESSURE: 68 MMHG | HEIGHT: 63 IN | BODY MASS INDEX: 28.53 KG/M2 | HEART RATE: 98 BPM | SYSTOLIC BLOOD PRESSURE: 116 MMHG

## 2023-05-04 DIAGNOSIS — Z80.3 FAMILY HISTORY OF BREAST CANCER: ICD-10-CM

## 2023-05-04 DIAGNOSIS — Z12.4 CERVICAL CANCER SCREENING: ICD-10-CM

## 2023-05-04 DIAGNOSIS — Z12.31 ENCOUNTER FOR SCREENING MAMMOGRAM FOR BREAST CANCER: ICD-10-CM

## 2023-05-04 DIAGNOSIS — Z01.419 ENCNTR FOR GYN EXAM (GENERAL) (ROUTINE) W/O ABN FINDINGS: Primary | ICD-10-CM

## 2023-05-04 DIAGNOSIS — Z91.89 AT RISK FOR BREAST CANCER: ICD-10-CM

## 2023-05-05 ENCOUNTER — TELEPHONE (OUTPATIENT)
Dept: HEMATOLOGY ONCOLOGY | Facility: CLINIC | Age: 43
End: 2023-05-05

## 2023-05-05 NOTE — TELEPHONE ENCOUNTER
I called Anyi to schedule a new patient consultation with [facility] in response to a referral sent to our department  I left a voicemail making patient aware of their referral and instructing them to call \[facility]\"" at [phone] to schedule  Another attempt will be made to schedule patient

## 2023-05-08 LAB
HPV HR 12 DNA CVX QL NAA+PROBE: NEGATIVE
HPV16 DNA CVX QL NAA+PROBE: NEGATIVE
HPV18 DNA CVX QL NAA+PROBE: NEGATIVE

## 2023-05-09 ENCOUNTER — TELEPHONE (OUTPATIENT)
Dept: HEMATOLOGY ONCOLOGY | Facility: CLINIC | Age: 43
End: 2023-05-09

## 2023-05-09 NOTE — TELEPHONE ENCOUNTER
I called Anyi to schedule a new patient consultation with [facility] in response to a referral sent to our department  I left a voicemail making patient aware of their referral and instructing them to call [facility] at [phone] to schedule  Another attempt will be made to schedule patient

## 2023-05-10 ENCOUNTER — TELEPHONE (OUTPATIENT)
Dept: HEMATOLOGY ONCOLOGY | Facility: CLINIC | Age: 43
End: 2023-05-10

## 2023-05-10 NOTE — TELEPHONE ENCOUNTER
I called Anyi to schedule a new patient consultation with [facility] in response to a referral sent to our department  I left a voicemail making patient aware of their referral and instructing them to call [facility] at [phone] to schedule  This is the third attempt to schedule patient unsuccessfully  The referral has been closed, a [employer] message has been sent to patient (if applicable) and a letter has been sent to patients address on file

## 2023-05-11 LAB
LAB AP GYN PRIMARY INTERPRETATION: NORMAL
Lab: NORMAL

## 2023-05-15 ENCOUNTER — TELEPHONE (OUTPATIENT)
Dept: HEMATOLOGY ONCOLOGY | Facility: CLINIC | Age: 43
End: 2023-05-15

## 2023-05-15 NOTE — TELEPHONE ENCOUNTER
Appointment Schedule   Who are you speaking with? Patient   If it is not the patient, are they listed on an active communication consent form? na   Which provider is the appointment scheduled with? SOLEDAD Leon   At which location is the appointment scheduled for? [facility]   When is the appointment scheduled? Please list date and time 6/2/23 @ 2pm   What is the reason for this appointment? New patient consult   Did patient voice understanding of the details of this appointment? yes   Was the no show policy reviewed with patient?  yes

## 2023-06-02 ENCOUNTER — CONSULT (OUTPATIENT)
Dept: SURGICAL ONCOLOGY | Facility: CLINIC | Age: 43
End: 2023-06-02

## 2023-06-02 VITALS
HEART RATE: 95 BPM | DIASTOLIC BLOOD PRESSURE: 70 MMHG | WEIGHT: 161.7 LBS | HEIGHT: 63 IN | BODY MASS INDEX: 28.65 KG/M2 | OXYGEN SATURATION: 100 % | RESPIRATION RATE: 16 BRPM | TEMPERATURE: 98.3 F | SYSTOLIC BLOOD PRESSURE: 120 MMHG

## 2023-06-02 DIAGNOSIS — Z12.39 ENCOUNTER FOR BREAST CANCER SCREENING OTHER THAN MAMMOGRAM: ICD-10-CM

## 2023-06-02 DIAGNOSIS — R92.2 DENSE BREASTS: Primary | ICD-10-CM

## 2023-06-02 DIAGNOSIS — Z91.89 AT RISK FOR BREAST CANCER: ICD-10-CM

## 2023-06-02 DIAGNOSIS — Z80.3 FAMILY HISTORY OF BREAST CANCER: ICD-10-CM

## 2023-06-02 NOTE — PROGRESS NOTES
Surgical Oncology Follow Up       [facility]  [facility] [id number]    Anastacio Mtz  1980  [id number]      Chief Complaint   Patient presents with   • Consult     Patient being seen for consult for risk for breast cancer, Family history of breast cancer  imaging in chart, no genetic testing  Assessment/Plan:  1  At risk for breast cancer  - Consume healthy diet, exercise regularly, maintain healthy weight  - 6 mo f/u visit    2  Family history of breast cancer      3  Dense breasts  - US breast screening bilateral complete (ABUS); Future    4  Encounter for breast cancer screening other than mammogram  - US breast screening bilateral complete (ABUS); Future    Discussion/Summary: Patient is a 46-year-old female with a family history of breast cancer in a paternal grandmother  No one in her family has had genetic testing  I reviewed that she would not likely meet criteria for genetic testing unless she is certain that her grandfather had pancreatic cancer and not colon cancer  She will discuss this further with her aunt and will contact me if she is interested in a referral to oncology genetics  There are no worrisome findings on her exam today  I have calculated her lifetime TC risk to be 23%  I reviewed risk reducing measures with her  I recommended annual 3D mammography and annual automated breast ultrasound as well as clinical breast exams every 6 months  I will make arrangements for her imaging in November and I will see her back at that time and then annually thereafter so her visits with me are staggered with her gynecologist that she sees in May  I encouraged her to be self breast aware and contact me with any changes on self-exam   She is in agreement with these recommendations  All of her questions were answered today      History of Present Illness:     -Interval History: Patient is a 46-year-old female that presents today for a consultation regarding an increased risk of breast cancer, family history of breast cancer and dense breast tissue  Her paternal grandmother was diagnosed with bilateral breast cancer in her mid 46s  Her paternal grandfather was diagnosed with either pancreatic or colon cancer at age 54  He was  approximately 1 year after his diagnosis  Patient had a bilateral 3D screening mammogram in 2022 which was BI-RADS 1, category 3 density  She has not appreciated any changes on her self breast exam   Menarche age 10 or 8, has never been pregnant  She has never used hormone replacement therapy  Review of Systems:  Review of Systems   Constitutional: Negative for activity change, appetite change, chills, fatigue, fever and unexpected weight change  Respiratory: Negative for cough and shortness of breath  Cardiovascular: Negative for chest pain  Gastrointestinal: Negative for abdominal pain, constipation, diarrhea, nausea and vomiting  Musculoskeletal: Negative for arthralgias, back pain, gait problem and myalgias  Skin: Negative for color change and rash  Neurological: Negative for dizziness and headaches  Hematological: Negative for adenopathy  Psychiatric/Behavioral: Negative for agitation and confusion  All other systems reviewed and are negative        Patient Active Problem List   Diagnosis   • Fatigue   • Anxiety   • Dietary iron deficiency without anemia   • OCD (obsessive compulsive disorder)   • Migraine without aura and without status migrainosus, not intractable   • Class 1 obesity due to excess calories with body mass index (BMI) of 33 0 to 33 9 in adult   • Family history of breast cancer   • At risk for breast cancer     Past Medical History:   Diagnosis Date   •  novel coronavirus disease (COVID-19) 2020   • Known health problems: none    • Psoriasis      Past Surgical History:   Procedure Laterality Date   • NO PAST SURGERIES Family History   Problem Relation Age of Onset   • Diabetes Mother    • Diabetes type II Mother         without complication    • Diabetes type II Father         without complication    • Cancer Father    • No Known Problems Maternal Grandmother    • No Known Problems Maternal Grandfather    • Breast cancer Paternal Grandmother    • No Known Problems Paternal Aunt    • Breast cancer additional onset Neg Hx    • BRCA2 Positive Neg Hx    • BRCA2 Negative Neg Hx    • BRCA1 Positive Neg Hx    • BRCA1 Negative Neg Hx    • BRCA 1/2 Neg Hx    • Endometrial cancer Neg Hx    • Colon cancer Neg Hx    • Ovarian cancer Neg Hx      Social History     Socioeconomic History   • Marital status: Single     Spouse name: Not on file   • Number of children: Not on file   • Years of education: Not on file   • Highest education level: Not on file   Occupational History   • Not on file   Tobacco Use   • Smoking status: Never   • Smokeless tobacco: Never   Vaping Use   • Vaping Use: Never used   Substance and Sexual Activity   • Alcohol use:  Yes   • Drug use: Never   • Sexual activity: Yes     Partners: Male     Birth control/protection: None   Other Topics Concern   • Not on file   Social History Narrative   • Not on file     Social Determinants of Health     Financial Resource Strain: Not on file   Food Insecurity: Not on file   Transportation Needs: Not on file   Physical Activity: Not on file   Stress: Not on file   Social Connections: Not on file   Intimate Partner Violence: Not on file   Housing Stability: Not on file       Current Outpatient Medications:   •  SUMAtriptan (IMITREX) 50 mg tablet, TAKE 1 TABLET BY MOUTH ONCE AS NEEDED FOR MIGRAINE FOR UP TO 1 DOSE , Disp: 9 tablet, Rfl: 5  •  phenazopyridine (PYRIDIUM) 200 mg tablet, Take 1 tablet (200 mg total) by mouth 3 (three) times a day with meals (Patient not taking: Reported on 10/31/2022), Disp: 10 tablet, Rfl: 0  No Known Allergies  Vitals:    06/02/23 1352   BP: 120/70 Pulse: 95   Resp: 16   Temp: 98 3 °F (36 8 °C)   SpO2: 100%       Physical Exam  Vitals reviewed  Constitutional:       Appearance: Normal appearance  HENT:      Head: Normocephalic and atraumatic  Pulmonary:      Effort: Pulmonary effort is normal    Chest:   Breasts:     Right: No swelling, bleeding, inverted nipple, mass, nipple discharge, skin change or tenderness  Left: No swelling, bleeding, inverted nipple, mass, nipple discharge, skin change or tenderness  Lymphadenopathy:      Upper Body:      Right upper body: No supraclavicular or axillary adenopathy  Left upper body: No supraclavicular or axillary adenopathy  Neurological:      General: No focal deficit present  Mental Status: She is alert and oriented to person, place, and time  Psychiatric:         Mood and Affect: Mood normal            Results:            Advance Care Planning/Advance Directives:  Discussed disease status and treatment goals with the patient

## 2023-08-29 ENCOUNTER — OFFICE VISIT (OUTPATIENT)
Dept: URGENT CARE | Facility: CLINIC | Age: 43
End: 2023-08-29
Payer: COMMERCIAL

## 2023-08-29 VITALS
HEIGHT: 63 IN | BODY MASS INDEX: 28.53 KG/M2 | DIASTOLIC BLOOD PRESSURE: 74 MMHG | OXYGEN SATURATION: 99 % | HEART RATE: 75 BPM | RESPIRATION RATE: 18 BRPM | TEMPERATURE: 96.7 F | SYSTOLIC BLOOD PRESSURE: 133 MMHG | WEIGHT: 161 LBS

## 2023-08-29 DIAGNOSIS — N30.00 ACUTE CYSTITIS WITHOUT HEMATURIA: Primary | ICD-10-CM

## 2023-08-29 LAB
SL AMB  POCT GLUCOSE, UA: NEGATIVE
SL AMB LEUKOCYTE ESTERASE,UA: ABNORMAL
SL AMB POCT BILIRUBIN,UA: NEGATIVE
SL AMB POCT BLOOD,UA: NEGATIVE
SL AMB POCT CLARITY,UA: CLEAR
SL AMB POCT COLOR,UA: YELLOW
SL AMB POCT KETONES,UA: ABNORMAL
SL AMB POCT NITRITE,UA: NEGATIVE
SL AMB POCT PH,UA: 5
SL AMB POCT SPECIFIC GRAVITY,UA: 1.01
SL AMB POCT URINE HCG: NEGATIVE
SL AMB POCT URINE PROTEIN: NEGATIVE
SL AMB POCT UROBILINOGEN: NORMAL

## 2023-08-29 PROCEDURE — 81025 URINE PREGNANCY TEST: CPT | Performed by: PHYSICIAN ASSISTANT

## 2023-08-29 PROCEDURE — 87086 URINE CULTURE/COLONY COUNT: CPT | Performed by: PHYSICIAN ASSISTANT

## 2023-08-29 PROCEDURE — 87077 CULTURE AEROBIC IDENTIFY: CPT | Performed by: PHYSICIAN ASSISTANT

## 2023-08-29 PROCEDURE — 81002 URINALYSIS NONAUTO W/O SCOPE: CPT | Performed by: PHYSICIAN ASSISTANT

## 2023-08-29 PROCEDURE — 99213 OFFICE O/P EST LOW 20 MIN: CPT | Performed by: PHYSICIAN ASSISTANT

## 2023-08-29 PROCEDURE — 87186 SC STD MICRODIL/AGAR DIL: CPT | Performed by: PHYSICIAN ASSISTANT

## 2023-08-29 RX ORDER — NITROFURANTOIN 25; 75 MG/1; MG/1
100 CAPSULE ORAL 2 TIMES DAILY
Qty: 10 CAPSULE | Refills: 0 | Status: SHIPPED | OUTPATIENT
Start: 2023-08-29 | End: 2023-09-03

## 2023-08-29 NOTE — PROGRESS NOTES
[facility]        NAME: Wu Rod is a 38 y.o. female  : 1980    MRN: [id number]  DATE: 2023  TIME: 5:39 PM    Assessment and Plan   Acute cystitis without hematuria [N30.00]  1. Acute cystitis without hematuria  POCT urine dip    Urine culture    POCT urine HCG    nitrofurantoin (MACROBID) 100 mg capsule            Patient Instructions   Take antibiotic as prescribed. Complete full dose of antibiotics even if symptoms begin to improve or resolve. May use OTC Tylenol for fever. DRINK LOTS OF FLUIDS. Observe for signs of worsening infection including increased pain, discharge, blood in the urine, back or flank pain, fever or chills, or persistent symptoms. Your symptoms should begin to improve over the next couple days. Follow-up with your PCP in 3-5 days if symptoms worsen or do not improve. Go to ER if symptoms become severe. Follow up with PCP in 3-5 days. Proceed to  ER if symptoms worsen. Chief Complaint     Chief Complaint   Patient presents with   • Possible UTI     Pt reports urinary frequency and burning since yesterday. History of Present Illness       Patient is a 44-year-old female with significant past medical history of migraines presents the office complaining of dysuria with urinary urgency and frequency since yesterday. Urinary Tract Infection   This is a new problem. The current episode started yesterday. The problem occurs every urination. The problem has been gradually worsening. There has been no fever. Associated symptoms include chills, frequency, hesitancy and urgency. Pertinent negatives include no flank pain, hematuria, nausea or vomiting. Review of Systems   Review of Systems   Constitutional: Positive for chills. Negative for fever. Gastrointestinal: Negative for abdominal pain, diarrhea, nausea and vomiting. Genitourinary: Positive for dysuria, frequency, hesitancy and urgency.  Negative for decreased urine volume, difficulty urinating, enuresis, flank pain, hematuria, pelvic pain, vaginal bleeding, vaginal discharge and vaginal pain. Skin: Negative for rash. Neurological: Positive for headaches. Current Medications       Current Outpatient Medications:   •  nitrofurantoin (MACROBID) 100 mg capsule, Take 1 capsule (100 mg total) by mouth 2 (two) times a day for 5 days, Disp: 10 capsule, Rfl: 0  •  SUMAtriptan (IMITREX) 50 mg tablet, TAKE 1 TABLET BY MOUTH ONCE AS NEEDED FOR MIGRAINE FOR UP TO 1 DOSE., Disp: 9 tablet, Rfl: 5  •  phenazopyridine (PYRIDIUM) 200 mg tablet, Take 1 tablet (200 mg total) by mouth 3 (three) times a day with meals (Patient not taking: Reported on 10/31/2022), Disp: 10 tablet, Rfl: 0    Current Allergies     Allergies as of 08/29/2023   • (No Known Allergies)            The following portions of the patient's history were reviewed and updated as appropriate: allergies, current medications, past family history, past medical history, past social history, past surgical history and problem list.     Past Medical History:   Diagnosis Date   • 2019 novel coronavirus disease (COVID-19) 12/28/2020   • Known health problems: none    • Psoriasis        Past Surgical History:   Procedure Laterality Date   • NO PAST SURGERIES         Family History   Problem Relation Age of Onset   • Diabetes Mother    • Diabetes type II Mother         without complication    • Diabetes type II Father         without complication    • Cancer Father    • No Known Problems Maternal Grandmother    • No Known Problems Maternal Grandfather    • Breast cancer Paternal Grandmother    • No Known Problems Paternal Aunt    • Breast cancer additional onset Neg Hx    • BRCA2 Positive Neg Hx    • BRCA2 Negative Neg Hx    • BRCA1 Positive Neg Hx    • BRCA1 Negative Neg Hx    • BRCA 1/2 Neg Hx    • Endometrial cancer Neg Hx    • Colon cancer Neg Hx    • Ovarian cancer Neg Hx          Medications have been verified.         Objective /74   Pulse 75   Temp (!) 96.7 °F (35.9 °C)   Resp 18   Ht 5' 3" (1.6 m)   Wt 73 kg (161 lb)   LMP 08/15/2023   SpO2 99%   BMI 28.52 kg/m²   Patient's last menstrual period was 08/15/2023. Physical Exam     Physical Exam  Vitals and nursing note reviewed. Constitutional:       Appearance: Normal appearance. She is well-developed. HENT:      Head: Normocephalic and atraumatic. Right Ear: External ear normal.      Left Ear: External ear normal.      Nose: Nose normal.   Eyes:      General: Lids are normal.   Cardiovascular:      Rate and Rhythm: Normal rate and regular rhythm. Pulses: Normal pulses. Heart sounds: Normal heart sounds. No murmur heard. No friction rub. No gallop. Pulmonary:      Effort: Pulmonary effort is normal.      Breath sounds: Normal breath sounds. No wheezing, rhonchi or rales. Abdominal:      General: Bowel sounds are normal.      Palpations: Abdomen is soft. Tenderness: There is no abdominal tenderness. There is no right CVA tenderness or left CVA tenderness. Musculoskeletal:         General: Normal range of motion. Lymphadenopathy:      Cervical: No cervical adenopathy. Skin:     General: Skin is warm and dry. Capillary Refill: Capillary refill takes less than 2 seconds. Findings: No rash. Neurological:      Mental Status: She is alert.        Urine preg: negative    Urine dip:    LEUKOCYTE ESTERASE,UA moderate    NITRITE,UA negative    SL AMB POCT UROBILINOGEN normal    POCT URINE PROTEIN negative     PH,UA 5.0    BLOOD,UA negative    SPECIFIC GRAVITY,UA 1.010    KETONES,UA small    BILIRUBIN,UA negative    GLUCOSE, UA negative     COLOR,UA yellow    CLARITY,UA clear

## 2023-08-29 NOTE — PATIENT INSTRUCTIONS
Take antibiotic as prescribed. Complete full dose of antibiotics even if symptoms begin to improve or resolve. May use OTC Tylenol for fever. DRINK LOTS OF FLUIDS. Observe for signs of worsening infection including increased pain, discharge, blood in the urine, back or flank pain, fever or chills, or persistent symptoms. Your symptoms should begin to improve over the next couple days. Follow-up with your PCP in 3-5 days if symptoms worsen or do not improve. Go to ER if symptoms become severe.

## 2023-08-31 LAB — BACTERIA UR CULT: ABNORMAL

## 2023-11-22 ENCOUNTER — HOSPITAL ENCOUNTER (OUTPATIENT)
Dept: MAMMOGRAPHY | Facility: CLINIC | Age: 43
Discharge: HOME | End: 2023-11-22
Payer: COMMERCIAL

## 2023-11-22 ENCOUNTER — HOSPITAL ENCOUNTER (OUTPATIENT)
Dept: ULTRASOUND IMAGING | Facility: CLINIC | Age: 43
Discharge: HOME | End: 2023-11-22
Payer: COMMERCIAL

## 2023-11-22 VITALS — BODY MASS INDEX: 28.52 KG/M2 | WEIGHT: 160.94 LBS | HEIGHT: 63 IN

## 2023-11-22 VITALS — BODY MASS INDEX: 28.51 KG/M2 | HEIGHT: 63 IN

## 2023-11-22 DIAGNOSIS — Z12.31 ENCOUNTER FOR SCREENING MAMMOGRAM FOR BREAST CANCER: ICD-10-CM

## 2023-11-22 DIAGNOSIS — R92.30 DENSE BREASTS: ICD-10-CM

## 2023-11-22 DIAGNOSIS — Z12.39 ENCOUNTER FOR BREAST CANCER SCREENING OTHER THAN MAMMOGRAM: ICD-10-CM

## 2023-11-22 DIAGNOSIS — R92.2 DENSE BREASTS: ICD-10-CM

## 2023-11-22 PROCEDURE — 76641 ULTRASOUND BREAST COMPLETE: CPT

## 2023-11-22 PROCEDURE — 77067 SCR MAMMO BI INCL CAD: CPT

## 2023-11-22 PROCEDURE — 77063 BREAST TOMOSYNTHESIS BI: CPT

## 2023-11-28 ENCOUNTER — RA CDI HCC (OUTPATIENT)
Dept: OTHER | Facility: HOSPITAL | Age: 43
End: 2023-11-28

## 2023-11-28 NOTE — PROGRESS NOTES
[address] [facility] coding opportunities       Chart reviewed, no opportunity found: CHART REVIEWED, NO OPPORTUNITY FOUND        Patients Insurance        Commercial Insurance: Joseph Farmer

## 2023-12-04 ENCOUNTER — OFFICE VISIT (OUTPATIENT)
Dept: SURGICAL ONCOLOGY | Facility: CLINIC | Age: 43
End: 2023-12-04
Payer: COMMERCIAL

## 2023-12-04 ENCOUNTER — APPOINTMENT (OUTPATIENT)
Dept: RADIOLOGY | Facility: CLINIC | Age: 43
End: 2023-12-04
Payer: COMMERCIAL

## 2023-12-04 ENCOUNTER — OFFICE VISIT (OUTPATIENT)
Dept: FAMILY MEDICINE CLINIC | Facility: CLINIC | Age: 43
End: 2023-12-04
Payer: COMMERCIAL

## 2023-12-04 VITALS
BODY MASS INDEX: 31.08 KG/M2 | DIASTOLIC BLOOD PRESSURE: 68 MMHG | WEIGHT: 175.4 LBS | TEMPERATURE: 98 F | HEART RATE: 79 BPM | SYSTOLIC BLOOD PRESSURE: 108 MMHG | OXYGEN SATURATION: 99 % | HEIGHT: 63 IN

## 2023-12-04 VITALS
SYSTOLIC BLOOD PRESSURE: 120 MMHG | DIASTOLIC BLOOD PRESSURE: 65 MMHG | HEART RATE: 71 BPM | WEIGHT: 174 LBS | BODY MASS INDEX: 30.83 KG/M2 | OXYGEN SATURATION: 97 % | HEIGHT: 63 IN

## 2023-12-04 DIAGNOSIS — E66.09 CLASS 1 OBESITY DUE TO EXCESS CALORIES WITHOUT SERIOUS COMORBIDITY WITH BODY MASS INDEX (BMI) OF 33.0 TO 33.9 IN ADULT: ICD-10-CM

## 2023-12-04 DIAGNOSIS — R92.8 ABNORMAL FINDING ON BREAST IMAGING: ICD-10-CM

## 2023-12-04 DIAGNOSIS — G43.009 MIGRAINE WITHOUT AURA AND WITHOUT STATUS MIGRAINOSUS, NOT INTRACTABLE: ICD-10-CM

## 2023-12-04 DIAGNOSIS — Z91.89 AT RISK FOR BREAST CANCER: ICD-10-CM

## 2023-12-04 DIAGNOSIS — Z12.31 VISIT FOR SCREENING MAMMOGRAM: ICD-10-CM

## 2023-12-04 DIAGNOSIS — M79.89 SOFT TISSUE CALCIFICATION: Primary | ICD-10-CM

## 2023-12-04 DIAGNOSIS — M79.89 SOFT TISSUE CALCIFICATION: ICD-10-CM

## 2023-12-04 DIAGNOSIS — Z80.3 FAMILY HISTORY OF BREAST CANCER: Primary | ICD-10-CM

## 2023-12-04 PROCEDURE — 99396 PREV VISIT EST AGE 40-64: CPT | Performed by: INTERNAL MEDICINE

## 2023-12-04 PROCEDURE — 99214 OFFICE O/P EST MOD 30 MIN: CPT | Performed by: INTERNAL MEDICINE

## 2023-12-04 PROCEDURE — 72050 X-RAY EXAM NECK SPINE 4/5VWS: CPT

## 2023-12-04 PROCEDURE — 99213 OFFICE O/P EST LOW 20 MIN: CPT | Performed by: NURSE PRACTITIONER

## 2023-12-04 NOTE — ASSESSMENT & PLAN NOTE
Her weight has come up officially into the obesity category because her BMI is currently beyond 30. She is up 14 pounds from last year. She was recommended to switch from candy to fruit. She was given a list of weight loss medications and check for approval from insurance.

## 2023-12-04 NOTE — PROGRESS NOTES
Surgical Oncology Follow Up       [facility]  [facility] [place] [address]    Billy Wisdom  1980  [id number]      Chief Complaint   Patient presents with    Follow-up       Assessment/Plan:  1. Family history of breast cancer  - 1 year f/u visit    2. At risk for breast cancer    3. Abnormal finding on breast imaging  - Diagnostic u/s 12/11- already ordered    4. Visit for screening mammogram  - Mammo screening bilateral w 3d & cad; Future         Discussion/Summary: Patient is a 72-year-old female with a family history of breast cancer in a paternal grandmother. No one in her family has had genetic testing. I had recommended annual 3D mammography and annual automated breast ultrasound as well as clinical breast exams every 6 months. She had a bilateral mammogram and automated breast ultrasound performed a couple weeks ago. The mammogram revealed a 9 mm oval mass in the central left breast.  The automated breast ultrasound revealed a possible right breast mass and 2 possible left breast masses and diagnostic ultrasounds were recommended. She is scheduled for a bilateral diagnostic ultrasound on 12/11. Of note on most recent mammogram, patient was noted to have scattered densities and is no longer heterogeneously dense. No worrisome findings on today's clinical exam.  If her upcoming imaging is benign, I will continue to follow her with annual 3D mammography and clinical exams. I will provide her with a prescription for next year's mammogram today. Obviously, this recommendation could change based on her upcoming imaging. I will plan to see her back in 1 year or sooner should the need arise. She was instructed to contact me with any changes or concerns in the interim. All of her questions were answered today. History of Present Illness:     -Interval History: Patient presents today for follow-up visit.   She has not appreciated any changes on self-exam. She had a bilateral mammogram and automated breast ultrasound performed a couple weeks ago. The mammogram revealed a 9 mm oval mass in the central left breast.  The automated breast ultrasound revealed a possible right breast mass and 2 possible left breast masses and diagnostic ultrasounds were recommended. She is scheduled for a bilateral diagnostic ultrasound on 12/11. Reports no changes in her family history. Review of Systems:  Review of Systems   Constitutional:  Negative for chills, fatigue and fever. Respiratory:  Negative for cough and shortness of breath. Cardiovascular:  Negative for chest pain. Hematological:  Negative for adenopathy. Psychiatric/Behavioral:  Negative for confusion.         Patient Active Problem List   Diagnosis    Fatigue    Anxiety    Dietary iron deficiency without anemia    OCD (obsessive compulsive disorder)    Migraine without aura and without status migrainosus, not intractable    Class 1 obesity due to excess calories with body mass index (BMI) of 33.0 to 33.9 in adult    Family history of breast cancer    At risk for breast cancer    Abnormal finding on breast imaging     Past Medical History:   Diagnosis Date    2019 novel coronavirus disease (COVID-19) 12/28/2020    Known health problems: none     Psoriasis      Past Surgical History:   Procedure Laterality Date    NO PAST SURGERIES       Family History   Problem Relation Age of Onset    Diabetes Mother     Diabetes type II Mother         without complication     Diabetes type II Father         without complication     Cancer Father     No Known Problems Maternal Grandmother     No Known Problems Maternal Grandfather     Breast cancer Paternal Grandmother     No Known Problems Paternal Grandfather     No Known Problems Paternal Aunt     Breast cancer additional onset Neg Hx     BRCA2 Positive Neg Hx     BRCA2 Negative Neg Hx     BRCA1 Positive Neg Hx     BRCA1 Negative Neg Hx     BRCA 1/2 Neg Hx     Endometrial cancer Neg Hx     Colon cancer Neg Hx     Ovarian cancer Neg Hx      Social History     Socioeconomic History    Marital status: Single     Spouse name: Not on file    Number of children: Not on file    Years of education: Not on file    Highest education level: Not on file   Occupational History    Not on file   Tobacco Use    Smoking status: Never    Smokeless tobacco: Never   Vaping Use    Vaping Use: Never used   Substance and Sexual Activity    Alcohol use: Yes    Drug use: Never    Sexual activity: Yes     Partners: Male     Birth control/protection: None   Other Topics Concern    Not on file   Social History Narrative    Not on file     Social Determinants of Health     Financial Resource Strain: Not on file   Food Insecurity: Not on file   Transportation Needs: Not on file   Physical Activity: Not on file   Stress: Not on file   Social Connections: Not on file   Intimate Partner Violence: Not on file   Housing Stability: Not on file       Current Outpatient Medications:     SUMAtriptan (IMITREX) 50 mg tablet, TAKE 1 TABLET BY MOUTH ONCE AS NEEDED FOR MIGRAINE FOR UP TO 1 DOSE., Disp: 9 tablet, Rfl: 5  No Known Allergies  Vitals:    12/04/23 1504   BP: 108/68   Pulse: 79   Temp: 98 °F (36.7 °C)   SpO2: 99%       Physical Exam  Vitals reviewed. Constitutional:       Appearance: Normal appearance. HENT:      Head: Normocephalic and atraumatic. Pulmonary:      Effort: Pulmonary effort is normal.   Chest:   Breasts:     Right: No swelling, bleeding, inverted nipple, mass, nipple discharge, skin change or tenderness. Left: No swelling, bleeding, inverted nipple, mass, nipple discharge, skin change or tenderness. Lymphadenopathy:      Upper Body:      Right upper body: No supraclavicular or axillary adenopathy. Left upper body: No supraclavicular or axillary adenopathy. Neurological:      General: No focal deficit present.       Mental Status: She is alert and oriented to person, place, and time. Psychiatric:         Mood and Affect: Mood normal.           Results:    Imaging    US breast screening bilateral complete (ABUS)    Result Date: 11/24/2023  Narrative: DIAGNOSIS: Dense breasts; Encounter for breast cancer screening other than mammogram TECHNIQUE: Automated breast ultrasound images were obtained on the [facility] system. Imaging was obtained in standard projections including AP, lateral and medial for both breasts, inclusive of all four quadrants and the retroareolar region. COMPARISONS: Prior breast imaging dated: 11/22/2023, 11/21/2022, 08/09/2019, and 08/09/2019 RELEVANT HISTORY: Family Breast Cancer History: History of breast cancer in Paternal Grandmother. Family Medical History: Family medical history includes breast cancer in paternal grandmother. Personal History: No known relevant hormone history. No known relevant surgical history. No known relevant medical history. RISK ASSESSMENT: 5 Year Tyrer-Cuzick: 1.24 % 10 Year Tyrer-Cuzick: 3.07 % Lifetime Tyrer-Cuzick: 21.2 % TISSUE COMPOSITION: Heterogeneous background echotexture INDICATION: Cindi Weiss is a 41 y.o. female with dense breasts presenting for automated breast ultrasound screening. FINDINGS: Bilateral In the right breast at 1:00, 1.8 cm from the nipple there is a hypoechoic area within a patch of dense tissue. This is concerning for possible mass. In the left breast at 1230, 3.8 cm from the nipple there is a hypoechoic shadowing area within a patch of dense tissue. This is concerning for possible mass. In the left breast at 7:00, 3 cm from the nipple there is an oval mass. This may correspond with the mass seen on the screening mammogram but is a little more inferior than expected. Additional evaluation of these findings is indicated with bilateral breast ultrasound. Impression:  Bilateral findings are incompletely characterized. Targeted ultrasound is recommended.  Automated breast ultrasound is an adjunct, not a replacement, for routine yearly screening mammography. ASSESSMENT/BI-RADS CATEGORY: Left: 0 - Incomplete: Needs Additional Imaging Evaluation Right: 0 - Incomplete: Needs Additional Imaging Evaluation Overall: 0 - Incomplete: Needs Additional Imaging Evaluation RECOMMENDATION:      - Ultrasound at the current time for both breasts. Workstation ID: [id number]    Mammo screening bilateral w 3d & cad    Result Date: 11/24/2023  Narrative: DIAGNOSIS: Encounter for screening mammogram for breast cancer TECHNIQUE: Digital screening mammography was performed. Computer Aided Detection (CAD) analyzed all applicable images. COMPARISONS: Prior breast imaging dated: 11/21/2022, 08/09/2019, and 08/09/2019 RELEVANT HISTORY: Family Breast Cancer History: History of breast cancer in Paternal Grandmother. Family Medical History: Family medical history includes breast cancer in paternal grandmother. Personal History: No known relevant hormone history. No known relevant surgical history. No known relevant medical history. The patient is scheduled in a reminder system for screening mammography. 8-10% of cancers will be missed on mammography. Management of a palpable abnormality must be based on clinical grounds. Patients will be notified of their results via letter from our facility. Accredited by [employer] of [employer] and FDA. RISK ASSESSMENT: 5 Year Tyrer-Cuzick: 1.24 % 10 Year Tyrer-Cuzick: 3.07 % Lifetime Tyrer-Cuzick: 21.2 % TISSUE DENSITY: There are scattered areas of fibroglandular density. INDICATION: Guevara Chiu is a 41 y.o. female presenting for screening mammography. FINDINGS: LEFT 1) MASS [A]: There is a 9 mm oval mass seen in the inner central region of the left breast in the middle depth. Right There are no suspicious masses, grouped microcalcifications or areas of unexplained architectural distortion. The skin and nipple areolar complex are unremarkable.       Impression: Additional imaging required. The patient also had a screening ultrasound performed the same day and was recalled for bilateral findings. Please see the separate report. At this time a bilateral ultrasound is indicated. A breast health care nurse from our facility will be contacting the patient regarding the need for additional imaging. This patient has been identified as being at elevated risk for development of breast cancer based on the Tyrer-Cuzick model. She may benefit from supplemental screening. ASSESSMENT/BI-RADS CATEGORY: Left: 0 - Incomplete: Needs Additional Imaging Evaluation Right: 1 - Negative Overall: 0 - Incomplete: Needs Additional Imaging Evaluation RECOMMENDATION:      - Ultrasound at the current time for both breasts. - Routine screening mammogram in 1 year for both breasts. Workstation ID: [id number]       I reviewed the above imaging data. Advance Care Planning/Advance Directives:  Discussed disease status and treatment goals with the patient.

## 2023-12-04 NOTE — PROGRESS NOTES
Assessment/Plan:    Problem List Items Addressed This Visit        Cardiovascular and Mediastinum    Migraine without aura and without status migrainosus, not intractable       Other    Class 1 obesity due to excess calories with body mass index (BMI) of 33.0 to 33.9 in adult     Her weight has come up officially into the obesity category because her BMI is currently beyond 30. She is up 14 pounds from last year. She was recommended to switch from candy to fruit. She was given a list of weight loss medications and check for approval from insurance. Other Visit Diagnoses     Soft tissue calcification    -  Primary    Relevant Orders    XR spine cervical complete 4 or 5 vw non injury          BMI Counseling: Body mass index is 30.82 kg/m². The BMI is above normal. Nutrition recommendations include limiting drinks that contain sugar and moderation in carbohydrate intake. Rationale for BMI follow-up plan is due to patient being overweight or obese. Depression Screening and Follow-up Plan: Patient was screened for depression during today's encounter. They screened negative with a PHQ-2 score of 0. Soft tissue calcification. I will place an order for cervical x-rays to assess her calcifications. Lower back pain. She was informed that if her back gets sore or stiff when she stands for a long time, she should get a low stool and rest her leg to help take some of the strain off her back. Migraines. Well-controlled with as needed sumatriptan. Wellness. Encouraged the patient to acquire influenza and COVID-19 vaccine but she declined. Follow-up  The patient will follow up in 2 months. Chief Complaint    Physical Exam; Care Gap Request         Patient ID: Mik Hdz is a 41 y.o. female who returns for a preventive visit.  She brought in a hard paper copy of a [facility] x-ray on which there is some soft tissue calcification which appears to be anterior to the cervical spine and she was asked by her dentist to ask me what the significance of this was. This was just a routine x-ray and there was not any particular problem at that time. The patient's back was hurting after taking her mammogram. It is currently better, but occasionally she has lower back pain especially if she is doing dishes for a prolonged period. She has gained weight. She eats the same way she normally does. At one point she was hungrier. She eats fast food once a week. She drinks coffee with almond milk. She eats candy, chocolate and desserts occasionally. She has switched from regular Coke to diet Coke. She is on weight loss medications. She still gets migraines around her period. She takes sumatriptan 1 tablet a day with benefit. Sometimes she takes a second tablet the next day, which seems to help. She is scheduled for an ultrasound on 12/11/2023. Dr. Patel Fairly her gynecologist did her Pap smear. She sees her dentist regularly. She is due to see her optometrist in 02/2024 or 03/2024. She did not acquire an influenza and COVID-19 vaccine. Her paternal grandmother had a history of breast cancer. Objective:    /65 (BP Location: Left arm, Patient Position: Sitting, Cuff Size: Standard)   Pulse 71   Ht 5' 3" (1.6 m)   Wt 78.9 kg (174 lb)   SpO2 97%   BMI 30.82 kg/m²     Wt Readings from Last 3 Encounters:   12/04/23 78.9 kg (174 lb)   11/22/23 73 kg (160 lb 15 oz)   08/29/23 73 kg (161 lb)         Physical Exam  Vitals reviewed. Constitutional:       General: She is not in acute distress. Appearance: Normal appearance. She is well-developed. She is not ill-appearing. HENT:      Head: Normocephalic and atraumatic. Right Ear: Tympanic membrane and external ear normal.      Left Ear: Tympanic membrane and external ear normal.      Nose: Nose normal.      Mouth/Throat:      Mouth: Mucous membranes are moist.      Pharynx: Oropharynx is clear. Eyes:      General: No scleral icterus.      Extraocular Movements: Extraocular movements intact. Pupils: Pupils are equal, round, and reactive to light. Neck:      Thyroid: No thyroid mass or thyromegaly. Vascular: No JVD. Cardiovascular:      Rate and Rhythm: Normal rate and regular rhythm. Heart sounds: Normal heart sounds. No murmur heard. No friction rub. No gallop. Pulmonary:      Breath sounds: Normal breath sounds. Abdominal:      General: Bowel sounds are normal. There is no distension. Palpations: Abdomen is soft. There is no mass. Musculoskeletal:         General: No swelling. Neurological:      Mental Status: She is alert.    Psychiatric:         Mood and Affect: Mood normal.           QDS: [address]

## 2023-12-11 ENCOUNTER — HOSPITAL ENCOUNTER (OUTPATIENT)
Dept: ULTRASOUND IMAGING | Facility: CLINIC | Age: 43
Discharge: HOME | End: 2023-12-11
Payer: COMMERCIAL

## 2023-12-11 DIAGNOSIS — R93.89 ABNORMAL ULTRASOUND: ICD-10-CM

## 2023-12-11 PROCEDURE — 76642 ULTRASOUND BREAST LIMITED: CPT

## 2024-02-02 ENCOUNTER — RA CDI HCC (OUTPATIENT)
Dept: OTHER | Facility: HOSPITAL | Age: 44
End: 2024-02-02

## 2024-02-05 ENCOUNTER — OFFICE VISIT (OUTPATIENT)
Dept: FAMILY MEDICINE CLINIC | Facility: CLINIC | Age: 44
End: 2024-02-05
Payer: COMMERCIAL

## 2024-02-05 VITALS
HEART RATE: 66 BPM | HEIGHT: 63 IN | BODY MASS INDEX: 31.36 KG/M2 | OXYGEN SATURATION: 96 % | WEIGHT: 177 LBS | DIASTOLIC BLOOD PRESSURE: 69 MMHG | SYSTOLIC BLOOD PRESSURE: 122 MMHG

## 2024-02-05 DIAGNOSIS — E66.09 CLASS 1 OBESITY DUE TO EXCESS CALORIES WITHOUT SERIOUS COMORBIDITY WITH BODY MASS INDEX (BMI) OF 33.0 TO 33.9 IN ADULT: Primary | ICD-10-CM

## 2024-02-05 PROCEDURE — 99213 OFFICE O/P EST LOW 20 MIN: CPT | Performed by: INTERNAL MEDICINE

## 2024-05-13 ENCOUNTER — OFFICE VISIT (OUTPATIENT)
Dept: FAMILY MEDICINE CLINIC | Facility: CLINIC | Age: 44
End: 2024-05-13
Payer: COMMERCIAL

## 2024-05-13 VITALS
BODY MASS INDEX: 31.22 KG/M2 | HEART RATE: 77 BPM | SYSTOLIC BLOOD PRESSURE: 111 MMHG | HEIGHT: 63 IN | OXYGEN SATURATION: 93 % | DIASTOLIC BLOOD PRESSURE: 69 MMHG | WEIGHT: 176.2 LBS

## 2024-05-13 DIAGNOSIS — K21.9 GASTROESOPHAGEAL REFLUX DISEASE, UNSPECIFIED WHETHER ESOPHAGITIS PRESENT: Primary | ICD-10-CM

## 2024-05-13 PROCEDURE — 99214 OFFICE O/P EST MOD 30 MIN: CPT | Performed by: INTERNAL MEDICINE

## 2024-05-13 RX ORDER — OMEPRAZOLE 20 MG/1
20 CAPSULE, DELAYED RELEASE ORAL
Qty: 30 CAPSULE | Refills: 5 | Status: SHIPPED | OUTPATIENT
Start: 2024-05-13 | End: 2024-11-09

## 2024-05-13 NOTE — PROGRESS NOTES
Name: Anyi Friend      : 1980      MRN: [id number]  Encounter Provider: Delvis Langley MD  Encounter Date: 2024   Encounter department: [facility] PRIMARY CARE    Assessment & Plan     Assessment & Plan  1. Obesity BMI 31  Her weight is basically unchanged.  I did encourage her to track her nutrition and she will be seeing the nutritionist at the end of the month.  Will reassess her in about 2 months.    2. Gastroesophageal reflux disease (GERD).  The patient has been advised to abstain from caffeinated beverages. A prescription for omeprazole has been issued, to be taken 30 minutes prior to breakfast.    Follow-up  The patient is scheduled for a follow-up visit at the end of 2024.     No orders of the defined types were placed in this encounter.      Subjective      History of Present Illness  The patient presents for evaluation of multiple medical concerns.    The patient reports no significant changes in her condition since her last visit. She is scheduled to consult with a nutritionist on 2024. Despite not adhering to a specific diet, her weight remains stable. She expresses a desire to consult with the nutritionist prior to initiating weight loss medications. She has yet to track her dietary intake. Her dietary intake is irregular, often skipping breakfast, occasionally skipping lunch or consuming a snack, and a full meal at dinner. Her evening meal is typically between 6:00 PM and 7:00 PM, occasionally consisting of hot tea. She acknowledges that her dietary intake is suboptimal, often resorting to a candy bar or snack in the vending machine. If they do not have leftovers, she buys lunch at her workplace. Her lunch occasionally includes a salad, a burger, or pizza. Her morning routine includes coffee, but she often does not finish it. If she finishes the coffee, she consumes Coke Zero or Diet Coke, and occasionally drinks water. She primarily drinks water at night.    The  "patient previously slept on her back, but recently, she has had to sleep on her side due to discomfort. She questions if this could be related to her weight. She experiences reflux even when she sleeps on her side, depending on what she eats. She takes Tums for reflux, which provides relief. However, she avoids sleeping on her back due to reflux. Certain foods, such as salsa, trigger heartburn.  Review of Systems      Objective     /69 (BP Location: Left arm, Patient Position: Sitting, Cuff Size: Large)   Pulse 77   Ht 5' 3\" (1.6 m)   Wt 79.9 kg (176 lb 3.2 oz)   SpO2 93%   BMI 31.21 kg/m²     Wt Readings from Last 3 Encounters:   05/13/24 79.9 kg (176 lb 3.2 oz)   02/05/24 80.3 kg (177 lb)   12/04/23 79.6 kg (175 lb 6.4 oz)      Physical Exam    Physical Exam  Constitutional:       General: She is not in acute distress.     Appearance: Normal appearance. She is not ill-appearing.      Comments: Exam otherwise deferred today.   Neurological:      Mental Status: She is alert.           "

## 2024-05-28 ENCOUNTER — OFFICE VISIT (OUTPATIENT)
Dept: FAMILY MEDICINE CLINIC | Facility: CLINIC | Age: 44
End: 2024-05-28
Payer: COMMERCIAL

## 2024-05-28 VITALS
SYSTOLIC BLOOD PRESSURE: 117 MMHG | HEIGHT: 63 IN | DIASTOLIC BLOOD PRESSURE: 67 MMHG | HEART RATE: 81 BPM | OXYGEN SATURATION: 95 % | BODY MASS INDEX: 31.18 KG/M2 | WEIGHT: 176 LBS

## 2024-05-28 DIAGNOSIS — Z01.419 ENCOUNTER FOR WELL WOMAN EXAM WITH ROUTINE GYNECOLOGICAL EXAM: ICD-10-CM

## 2024-05-28 DIAGNOSIS — N30.00 ACUTE CYSTITIS WITHOUT HEMATURIA: Primary | ICD-10-CM

## 2024-05-28 LAB
SL AMB  POCT GLUCOSE, UA: NORMAL
SL AMB LEUKOCYTE ESTERASE,UA: ABNORMAL
SL AMB POCT BILIRUBIN,UA: ABNORMAL
SL AMB POCT BLOOD,UA: ABNORMAL
SL AMB POCT CLARITY,UA: CLEAR
SL AMB POCT COLOR,UA: YELLOW
SL AMB POCT KETONES,UA: ABNORMAL
SL AMB POCT NITRITE,UA: ABNORMAL
SL AMB POCT PH,UA: 5
SL AMB POCT SPECIFIC GRAVITY,UA: 1.02
SL AMB POCT URINE PROTEIN: ABNORMAL
SL AMB POCT UROBILINOGEN: NORMAL

## 2024-05-28 PROCEDURE — 81002 URINALYSIS NONAUTO W/O SCOPE: CPT | Performed by: INTERNAL MEDICINE

## 2024-05-28 PROCEDURE — 99213 OFFICE O/P EST LOW 20 MIN: CPT | Performed by: INTERNAL MEDICINE

## 2024-05-28 RX ORDER — NITROFURANTOIN 25; 75 MG/1; MG/1
100 CAPSULE ORAL 2 TIMES DAILY
Qty: 10 CAPSULE | Refills: 0 | Status: SHIPPED | OUTPATIENT
Start: 2024-05-28 | End: 2024-06-02

## 2024-05-28 NOTE — PROGRESS NOTES
"Ambulatory Visit  Name: Anyi Friend      : 1980      MRN: [id number]  Encounter Provider: Delvis Langley MD  Encounter Date: 2024   Encounter department: [facility] PRIMARY CARE    Assessment & Plan  1. Urinary Tract Infection (UTI).  Urine dip was positive for leukocytes, trace ketones.  Will treat her with Macrodantin 100 mg twice a day for 5 days.  Encouraged her to void after intercourse.         History of Present Illness     History of Present Illness  The patient is a 43-year-old female who presents for evaluation of UTI.    The patient suspects a urinary tract infection (UTI) as her symptoms initiated last night, characterized by urinary frequency and dysuria. She reports discomfort when seated and produces small amounts of urine. She denies the presence of hematuria, systemic symptoms such as fever, chills, sweats, nausea, or vomiting. She has a history of recurrent urinary tract infections, with the most recent episode occurring in the fall of , for which she sought care at an urgent care facility where a urine dipstick test was performed.     Review of Systems  Objective     /67 (BP Location: Left arm, Patient Position: Sitting, Cuff Size: Large)   Pulse 81   Ht 5' 3\" (1.6 m)   Wt 79.8 kg (176 lb)   SpO2 95%   BMI 31.18 kg/m²     Physical Exam    Physical Exam  Constitutional:       General: She is not in acute distress.     Appearance: Normal appearance. She is not ill-appearing.   Abdominal:      General: Abdomen is flat. There is no distension.      Palpations: Abdomen is soft.      Tenderness: There is no abdominal tenderness.   Neurological:      Mental Status: She is alert.       Administrative Statements         "

## 2024-05-30 ENCOUNTER — CLINICAL SUPPORT (OUTPATIENT)
Dept: NUTRITION | Facility: CLINIC | Age: 44
End: 2024-05-30
Payer: COMMERCIAL

## 2024-05-30 VITALS — BODY MASS INDEX: 31 KG/M2 | WEIGHT: 175 LBS

## 2024-05-30 DIAGNOSIS — E66.09 CLASS 1 OBESITY DUE TO EXCESS CALORIES WITHOUT SERIOUS COMORBIDITY WITH BODY MASS INDEX (BMI) OF 33.0 TO 33.9 IN ADULT: ICD-10-CM

## 2024-05-30 PROCEDURE — 97802 MEDICAL NUTRITION INDIV IN: CPT | Performed by: DIETITIAN, REGISTERED

## 2024-05-30 NOTE — PROGRESS NOTES
" Nutrition Assessment Form    Patient Name: Anyi Friend    YOB: 1980    Sex: Female     Assessment Date: 5/30/2024  Start Time: 11:10 AM Stop Time: 12:15 PM Total Minutes: 65 min     Data:  Present at session: self   Parent/Patient Concerns/reason for visit: \"I'd like to lose more weight.\"    Medical Dx/Reason for Referral: Obesity   Past Medical History:   Diagnosis Date    2019 novel coronavirus disease (COVID-19) 12/28/2020    Known health problems: none     Psoriasis        Current Outpatient Medications   Medication Sig Dispense Refill    nitrofurantoin (MACROBID) 100 mg capsule Take 1 capsule (100 mg total) by mouth 2 (two) times a day for 5 days 10 capsule 0    omeprazole (PriLOSEC) 20 mg delayed release capsule Take 1 capsule (20 mg total) by mouth daily before breakfast 30 capsule 5    SUMAtriptan (IMITREX) 50 mg tablet TAKE 1 TABLET BY MOUTH ONCE AS NEEDED FOR MIGRAINE FOR UP TO 1 DOSE. 9 tablet 5     No current facility-administered medications for this visit.        Additional Meds/Supplements: Women's one a day, takes iron pills occasionally, sometimes will have constipation with this, bloated from constipation    Special Learning Needs/barriers to learning/any new barriers N/a   Height: HC Readings from Last 5 Encounters:   No data found for HC      Weight: Wt Readings from Last 10 Encounters:   05/30/24 79.4 kg (175 lb)   05/28/24 79.8 kg (176 lb)   05/13/24 79.9 kg (176 lb 3.2 oz)   02/05/24 80.3 kg (177 lb)   12/04/23 79.6 kg (175 lb 6.4 oz)   12/04/23 78.9 kg (174 lb)   11/22/23 73 kg (160 lb 15 oz)   08/29/23 73 kg (161 lb)   06/02/23 73.3 kg (161 lb 11.2 oz)   05/04/23 73 kg (161 lb)     Estimated body mass index is 31 kg/m² as calculated from the following:    Height as of 5/28/24: 5' 3\" (1.6 m).    Weight as of this encounter: 79.4 kg (175 lb).   Recent Weight Change: [x]Yes     []No  Amount: Was at 180lb previously, difficulties losing more than this. Was 120lb when in " highschool.       Energy Needs: [place]- StRadha Valadez Equation: 1400 kcal (mifflin x 1.3 activity minus 500 for 1 lb weight loss per day)   No Known Allergies or intolerances Doesn't care for fish apart from breaded shrimp, prefers poultry   Social History     Substance and Sexual Activity   Alcohol Use Yes    Couple times per month such as at a party   Social History     Tobacco Use   Smoking Status Never   Smokeless Tobacco Never    None    Who shops? spouse   Who cooks/cooking methods/Eating out/take out habits   spouse or eat out  Cooking methods: bake/bermudez/air bermudez/grill/boiil--variety of methods,  follows gluten free diet and pt follows this too     Take out: At lunch for work    Dining out Most dinners will eat out   Exercise: Works as , sedentary job      Other: ie: Sleep habits/ stress level/ work habits household-lives with ?/ food security No food insecurity    5 hours sleep most nights, wakes up early at 3:30-4 AM, sleep isn't consistent   Prior Nutritional Counseling? []Yes     [x]No  When:      Why:         Diet Hx:  Breakfast: Diet: n/a  Skips or may have it on the weekend--at [employer], home fries, turkey sausage, toast when going out with her  for quality time or would have muffin with coffee at home or gluten free cheerios with coffee     Lunch: May skip this too or may have a small snack at work, typically get snacks from vending machine at work like snickers bar. If does eat lunch will order out pizza with coworkers.          Dinner: Usually go to mexican restaurants like [employer] [place] or [employer]  -chicken tacos or chicken burrito bowl with rice and beans with guacamole and chips, may share the meal depending upon appetite         Snacks: AM - gluten free pretzels or [place] almond bar or gluten free animal crackers  PM -   HS - occasionally, not everyday, typically chocolate candy bar or GF chocolate cake/cookies    Drinks coffee with almond creamer, at [employer] would have  coffee with whole milk and caramel swirl, may have 2 cups per day, drinks diet coke or coke zero or hot tea   Other Notes/ Initial Assessment:  Pt has sedentary desk job, 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 Pt reports her eating habits at work vary greatly, may or may not have snacks/meals there, typically relies on convenient foods from vending machine or takeout. Eats dinner out most every night during the week.     Updated assessment (Follow up note only):           Nutrition Diagnosis:   Overweight/obesity  related to Excess energy intake as evidenced by  BMI more than normative standard for age and sex (obesity-grade I 30-34.9)       Any change or new dx since previous visit:       Medical Nutrition Therapy Intervention:  [x]Individualized Meal Plan--Discussed variety of snack options pt can take to work that are lower calorie/more nutritious options such as fruit cup with string cheese and small apple or Greek yogurt with berries or steamable veggie bags with hummus or raw veggies with dip etc. Aim for 200 kcal x 1 snack at work, goal of 700 kcal consumed while at work + 700 kcal at dinner meal. Encouraged nonstarchy vegetables and fruit options.  []Understanding Lab Values   []Basic Pathophysiology of Disease []Food/Medication Interactions   [x]Food Diary--pt is interested in calorie tracking though concerned she will not follow through with this. Will discuss further at follow up appointment prior to starting calorie tracking.  []Exercise   []Lifestyle/Behavior Modification Techniques []Medication, Mechanism of Action   [x]Label Reading: Discussed how to read nutrition labels to identify calorie content/serving size.  []Self Blood Glucose Monitoring   [x]Weight/BMI Goals: Goal of 0.5 - 1 lb weight loss per week.  [x]Other -     Handouts provided: 1400 kcal sample meal plan, Nonstarchy vegetables food list           Comprehension: []Excellent  []Very  "Good  [x]Good  []Fair   []Poor    Receptivity: []Excellent  []Very Good  [x]Good  []Fair   []Poor    Expected Compliance: []Excellent  []Very Good  [x]Good  []Fair   []Poor        Goals (initial)/ Progress made on previous goals/new goals:  Pt to lose 0.5 - 1 lb per week upon follow up.   2. Pt to limit snack options at work to 200 kcal each day upon follow up.    3.       No follow-ups on file.  Labs:  CMP  Lab Results   Component Value Date    K 3.8 11/01/2018     11/01/2018    CO2 23 11/01/2018    BUN 9 11/01/2018    CREATININE 0.75 11/01/2018    GLUF 84 11/01/2018    CALCIUM 8.7 11/01/2018    AST 42 03/01/2018    ALT 82 (H) 03/01/2018    ALKPHOS 115 03/01/2018    EGFR 103 11/01/2018       BMP  Lab Results   Component Value Date    CALCIUM 8.7 11/01/2018    K 3.8 11/01/2018    CO2 23 11/01/2018     11/01/2018    BUN 9 11/01/2018    CREATININE 0.75 11/01/2018       Lipids  No results found for: \"CHOL\"  Lab Results   Component Value Date    HDL 61 12/23/2019     Lab Results   Component Value Date    LDLCALC 93 12/23/2019     Lab Results   Component Value Date    TRIG 48 12/23/2019     No results found for: \"CHOLHDL\"    Hemoglobin A1C  No results found for: \"HGBA1C\"    Fasting Glucose  Lab Results   Component Value Date    GLUF 84 11/01/2018       Insulin     Thyroid  No results found for: \"TSH\", \"L7DNCDA\", \"K4LFBHP\", \"THYROIDAB\"    Hepatic Function Panel  Lab Results   Component Value Date    ALT 82 (H) 03/01/2018    AST 42 03/01/2018    ALKPHOS 115 03/01/2018       Celiac Disease Antibody Panel  No results found for: \"ENDOMYSIAL IGA\", \"GLIADIN IGA\", \"GLIADIN IGG\", \"IGA\", \"TISSUE TRANSGLUT AB\", \"TTG IGA\"   Iron  Lab Results   Component Value Date    IRON 80 11/01/2018    TIBC 421 11/01/2018    FERRITIN 7 (L) 11/01/2018            Nicole Flannery RD, LDN  [facility] CLINICAL NUTRITION SERVICES  [address] [place] [address] [address]  [facility] [id number]    "

## 2024-06-04 DIAGNOSIS — K21.9 GASTROESOPHAGEAL REFLUX DISEASE, UNSPECIFIED WHETHER ESOPHAGITIS PRESENT: ICD-10-CM

## 2024-06-05 RX ORDER — OMEPRAZOLE 20 MG/1
20 CAPSULE, DELAYED RELEASE ORAL
Qty: 90 CAPSULE | Refills: 1 | Status: SHIPPED | OUTPATIENT
Start: 2024-06-05

## 2024-07-25 ENCOUNTER — CLINICAL SUPPORT (OUTPATIENT)
Dept: NUTRITION | Facility: CLINIC | Age: 44
End: 2024-07-25
Payer: COMMERCIAL

## 2024-07-25 VITALS — WEIGHT: 170.8 LBS | BODY MASS INDEX: 30.26 KG/M2

## 2024-07-25 DIAGNOSIS — E66.9 OBESITY, CLASS I, BMI 30-34.9: Primary | ICD-10-CM

## 2024-07-25 PROCEDURE — 97803 MED NUTRITION INDIV SUBSEQ: CPT | Performed by: DIETITIAN, REGISTERED

## 2024-07-25 NOTE — PROGRESS NOTES
" Nutrition Assessment Form    Patient Name: Anyi Friend    YOB: 1980    Sex: Female     Assessment Date: 7/25/2024  Start Time: 11:20 AM Stop Time: 11:48 AM Total Minutes: 28 min     Data:  Present at session: self   Parent/Patient Concerns/reason for visit: \"I was trying!\" No concerns reported today.    Medical Dx/Reason for Referral: Obesity   Past Medical History:   Diagnosis Date    2019 novel coronavirus disease (COVID-19) 12/28/2020    Known health problems: none     Psoriasis        Current Outpatient Medications   Medication Sig Dispense Refill    omeprazole (PriLOSEC) 20 mg delayed release capsule TAKE 1 CAPSULE BY MOUTH DAILY BEFORE BREAKFAST. 90 capsule 1    SUMAtriptan (IMITREX) 50 mg tablet TAKE 1 TABLET BY MOUTH ONCE AS NEEDED FOR MIGRAINE FOR UP TO 1 DOSE. 9 tablet 5     No current facility-administered medications for this visit.        Additional Meds/Supplements: Women's one a day, takes iron pills occasionally, sometimes will have constipation with this, bloated from constipation    Special Learning Needs/barriers to learning/any new barriers N/a   Height: HC Readings from Last 5 Encounters:   No data found for HC      Weight: Wt Readings from Last 10 Encounters:   05/30/24 79.4 kg (175 lb)   05/28/24 79.8 kg (176 lb)   05/13/24 79.9 kg (176 lb 3.2 oz)   02/05/24 80.3 kg (177 lb)   12/04/23 79.6 kg (175 lb 6.4 oz)   12/04/23 78.9 kg (174 lb)   11/22/23 73 kg (160 lb 15 oz)   08/29/23 73 kg (161 lb)   06/02/23 73.3 kg (161 lb 11.2 oz)   05/04/23 73 kg (161 lb)     Estimated body mass index is 31 kg/m² as calculated from the following:    Height as of 5/28/24: 5' 3\" (1.6 m).    Weight as of 5/30/24: 79.4 kg (175 lb).   Recent Weight Change: [x]Yes     []No  Amount: Was at 180lb previously, difficulties losing more than this. Was 120lb when in highschool.       Energy Needs: [place]- St. Jeor Equation: 1400 kcal (mifflin x 1.3 activity minus 500 for 1 lb weight loss per day)   No Known " Allergies or intolerances Doesn't care for fish apart from breaded shrimp, prefers poultry   Social History     Substance and Sexual Activity   Alcohol Use Yes    Couple times per month such as at a party   Social History     Tobacco Use   Smoking Status Never   Smokeless Tobacco Never    None    Who shops? spouse   Who cooks/cooking methods/Eating out/take out habits   spouse or eat out  Cooking methods: bake/bermudez/air bermudez/grill/boiil--variety of methods,  follows gluten free diet and pt follows this too     Take out: At lunch for work    Dining out Most dinners will eat out   Exercise: Works as , sedentary job      Other: ie: Sleep habits/ stress level/ work habits household-lives with ?/ food security No food insecurity    5 hours sleep most nights, wakes up early at 3:30-4 AM, sleep isn't consistent   Prior Nutritional Counseling? []Yes     [x]No  When:      Why:         Diet Hx:  Breakfast: Diet: n/a  Now intentionally eating breakfast, maybe a granola bar etc.          Lunch: [place] made at home          Dinner: Usually go to mexican restaurants like [employer] [place] or [employer]  -chicken tacos or chicken burrito bowl with rice and beans with guacamole and chips, may share the meal depending upon appetite.    Now splitting the restaurant meal with her .          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 Pt reports her eating habits at work vary greatly, may or may not have snacks/meals there, typically relies on convenient foods from vending machine or takeout. Eats dinner out most every night during the week.     Updated  "assessment (Follow up note only):  Pt has successfully lost 5lb from previous appointment (~.5lb weight loss per week). Pt has been calorie tracking which she found helpful and manageable. \"I know I would lose more weight if I was exercising.\"          Nutrition Diagnosis:   Overweight/obesity  related to Excess energy intake as evidenced by  BMI more than normative standard for age and sex (obesity-grade I 30-34.9)       Any change or new dx since previous visit:       Medical Nutrition Therapy Intervention:  [x]Individualized Meal Plan--Congratulated pt on changes she has made. Encouraged continuation of 3 meals per day with calorie tracking. Provided with recipes for new food suggestions.  []Understanding Lab Values   []Basic Pathophysiology of Disease []Food/Medication Interactions   [x]Food Diary--Continue with calorie tracking, goal of 1400 kcal per day.   [x]Exercise--Suggested walking plan to help aid in further weight loss. Pt to consider walking with her  after dinner each night at walking path nearby.    []Lifestyle/Behavior Modification Techniques []Medication, Mechanism of Action   []Label Reading:  []Self Blood Glucose Monitoring   [x]Weight/BMI Goals: Goal of 0.5 - 1 lb weight loss per week.  [x]Other -     Handouts provided: 6 Week Beginner Walking Plan, Recipes for: Roasted Veggies, Grilled Cabbage Steaks, Gyros, Quick Lunch Ideas, almond davis date candies    Handouts previously provided: 1400 kcal sample meal plan, Nonstarchy vegetables food list           Comprehension: []Excellent  []Very Good  [x]Good  []Fair   []Poor    Receptivity: []Excellent  []Very Good  [x]Good  []Fair   []Poor    Expected Compliance: []Excellent  []Very Good  [x]Good  []Fair   []Poor        Goals (initial)/ Progress made on previous goals/new goals:  Pt to continue to lose 0.5 - 1 lb per week upon follow up.   2. Pt to limit snack options at work to 200 kcal each day upon follow up.    3.       No follow-ups on " "file.  Labs:  CMP  Lab Results   Component Value Date    K 3.8 11/01/2018     11/01/2018    CO2 23 11/01/2018    BUN 9 11/01/2018    CREATININE 0.75 11/01/2018    GLUF 84 11/01/2018    CALCIUM 8.7 11/01/2018    AST 42 03/01/2018    ALT 82 (H) 03/01/2018    ALKPHOS 115 03/01/2018    EGFR 103 11/01/2018       BMP  Lab Results   Component Value Date    CALCIUM 8.7 11/01/2018    K 3.8 11/01/2018    CO2 23 11/01/2018     11/01/2018    BUN 9 11/01/2018    CREATININE 0.75 11/01/2018       Lipids  No results found for: \"CHOL\"  Lab Results   Component Value Date    HDL 61 12/23/2019     Lab Results   Component Value Date    LDLCALC 93 12/23/2019     Lab Results   Component Value Date    TRIG 48 12/23/2019     No results found for: \"CHOLHDL\"    Hemoglobin A1C  No results found for: \"HGBA1C\"    Fasting Glucose  Lab Results   Component Value Date    GLUF 84 11/01/2018       Insulin     Thyroid  No results found for: \"TSH\", \"Z2MMQNO\", \"I5BFEYJ\", \"THYROIDAB\"    Hepatic Function Panel  Lab Results   Component Value Date    ALT 82 (H) 03/01/2018    AST 42 03/01/2018    ALKPHOS 115 03/01/2018       Celiac Disease Antibody Panel  No results found for: \"ENDOMYSIAL IGA\", \"GLIADIN IGA\", \"GLIADIN IGG\", \"IGA\", \"TISSUE TRANSGLUT AB\", \"TTG IGA\"   Iron  Lab Results   Component Value Date    IRON 80 11/01/2018    TIBC 421 11/01/2018    FERRITIN 7 (L) 11/01/2018            Nicole Flannery, RD, LDN  [facility]  [facility] CLINICAL NUTRITION SERVICES  [address] [facility] [address] [place] [id number]    "

## 2024-08-12 ENCOUNTER — OFFICE VISIT (OUTPATIENT)
Dept: FAMILY MEDICINE CLINIC | Facility: CLINIC | Age: 44
End: 2024-08-12
Payer: COMMERCIAL

## 2024-08-12 VITALS
HEIGHT: 63 IN | DIASTOLIC BLOOD PRESSURE: 62 MMHG | SYSTOLIC BLOOD PRESSURE: 112 MMHG | BODY MASS INDEX: 30.02 KG/M2 | WEIGHT: 169.4 LBS | HEART RATE: 62 BPM | OXYGEN SATURATION: 96 %

## 2024-08-12 DIAGNOSIS — E66.09 CLASS 1 OBESITY DUE TO EXCESS CALORIES WITHOUT SERIOUS COMORBIDITY WITH BODY MASS INDEX (BMI) OF 33.0 TO 33.9 IN ADULT: Primary | ICD-10-CM

## 2024-08-12 PROCEDURE — 99213 OFFICE O/P EST LOW 20 MIN: CPT | Performed by: INTERNAL MEDICINE

## 2024-08-12 NOTE — PROGRESS NOTES
Ambulatory Visit  Name: Anyi Friend      : 1980      MRN: [id number]  Encounter Provider: Delvis Langley MD  Encounter Date: 2024   Encounter department: [facility] PRIMARY CARE    Assessment & Plan  1. Obesity BMI 33.  She has successfully lost 7 pounds since her last visit in May, now weighing 169 pounds. She finds calorie tracking helpful and has been mindful of her intake, particularly avoiding high-calorie items like mixed alcoholic drinks. She does not currently weigh or measure her food portions, which could be beneficial for more precise calorie control. The importance of protein intake was discussed to prevent muscle mass loss during weight loss. She was informed that an ounce of chicken provides approximately 7 grams of protein and that a daily intake of at least 60 grams is recommended. She was advised to continue with her current regimen of calorie tracking and portion control and to incorporate regular exercise into her routine for overall health benefits and weight maintenance.              History of Present Illness       History of Present Illness  The patient is a 43-year-old female who presents for evaluation of weight loss.    She has been under the guidance of a nutritionist, resulting in a weight loss of approximately 5 pounds. Her current weight is 169 pounds, down from 176 pounds in May 2024. She has found calorie tracking beneficial and has been mindful of her food choices, particularly avoiding high-calorie items like mixed alcoholic drinks. Although she does not measure her food portions, she follows the nutritionist's advice on portion sizes. The nutritionist has recommended a daily intake of 1400 calories, divided into 700 calories each for breakfast, lunch, and dinner.     She has been incorporating non-starchy vegetables into her diet, such as green beans and carrots, which keep her satiated until her next meal. If she feels hungry, she opts for vegetables.  "While she has not received specific recommendations regarding protein intake, she includes chicken in her diet. She does not use an romaine to track her macronutrients and plans to continue seeing the nutritionist monthly.    She had a urinary tract infection in May 2024.        Objective     /62 (BP Location: Right arm, Patient Position: Sitting, Cuff Size: Large)   Pulse 62   Ht 5' 3\" (1.6 m)   Wt 76.8 kg (169 lb 6.4 oz)   SpO2 96%   BMI 30.01 kg/m²       Wt Readings from Last 6 Encounters:   08/12/24 76.8 kg (169 lb 6.4 oz)   07/25/24 77.5 kg (170 lb 12.8 oz)   05/30/24 79.4 kg (175 lb)   05/28/24 79.8 kg (176 lb)   05/13/24 79.9 kg (176 lb 3.2 oz)   02/05/24 80.3 kg (177 lb)      Physical Exam    Physical Exam  Constitutional:       General: She is not in acute distress.     Appearance: Normal appearance. She is not ill-appearing.   Neurological:      Mental Status: She is alert.       Administrative Statements       "

## 2024-08-26 NOTE — PROGRESS NOTES
" Nutrition Assessment Form    Patient Name: Anyi Friend    YOB: 1980    Sex: Female     Assessment Date: 8/26/2024  Start Time: 3:33 PM Stop Time: 4:03 PM Total Minutes: 30 min     Data:  Present at session: self   Parent/Patient Concerns/reason for visit: \"I feel like I'm at a stand still with my weight.\"    Medical Dx/Reason for Referral: Obesity   Past Medical History:   Diagnosis Date    2019 novel coronavirus disease (COVID-19) 12/28/2020    Known health problems: none     Psoriasis        Current Outpatient Medications   Medication Sig Dispense Refill    omeprazole (PriLOSEC) 20 mg delayed release capsule TAKE 1 CAPSULE BY MOUTH DAILY BEFORE BREAKFAST. 90 capsule 1    SUMAtriptan (IMITREX) 50 mg tablet TAKE 1 TABLET BY MOUTH ONCE AS NEEDED FOR MIGRAINE FOR UP TO 1 DOSE. 9 tablet 5     No current facility-administered medications for this visit.        Additional Meds/Supplements: Women's one a day, takes iron pills occasionally, sometimes will have constipation with this, bloated from constipation    Special Learning Needs/barriers to learning/any new barriers N/a   Height: HC Readings from Last 5 Encounters:   No data found for HC      Weight: Wt Readings from Last 10 Encounters:   08/12/24 76.8 kg (169 lb 6.4 oz)   07/25/24 77.5 kg (170 lb 12.8 oz)   05/30/24 79.4 kg (175 lb)   05/28/24 79.8 kg (176 lb)   05/13/24 79.9 kg (176 lb 3.2 oz)   02/05/24 80.3 kg (177 lb)   12/04/23 79.6 kg (175 lb 6.4 oz)   12/04/23 78.9 kg (174 lb)   11/22/23 73 kg (160 lb 15 oz)   08/29/23 73 kg (161 lb)     Estimated body mass index is 30.01 kg/m² as calculated from the following:    Height as of 8/12/24: 5' 3\" (1.6 m).    Weight as of 8/12/24: 76.8 kg (169 lb 6.4 oz).   Recent Weight Change: [x]Yes     []No  Amount: Successful weight loss from initial appointment 5/30/24 175lb.       Energy Needs: [place]- [place]. Jeor Equation: 1400 kcal (mifflin x 1.3 activity minus 500 for 1 lb weight loss per day)   No Known " Allergies or intolerances Doesn't care for fish apart from breaded shrimp, prefers poultry   Social History     Substance and Sexual Activity   Alcohol Use Yes    Couple times per month such as at a party   Social History     Tobacco Use   Smoking Status Never   Smokeless Tobacco Never    None    Who shops? spouse   Who cooks/cooking methods/Eating out/take out habits   spouse or eat out  Cooking methods: bake/bermudez/air bermudez/grill/boiil--variety of methods,  follows gluten free diet and pt follows this too     Take out: At lunch for work    Dining out Most dinners will eat out   Exercise: Works as , sedentary job      Other: ie: Sleep habits/ stress level/ work habits household-lives with ?/ food security No food insecurity    5 hours sleep most nights, wakes up early at 3:30-4 AM, sleep isn't consistent   Prior Nutritional Counseling? []Yes     [x]No  When:      Why:         Diet Hx:  Breakfast: Diet: n/a  Granola bar or yogurt          Lunch: [place] made at home or just yogurt if didn't have time          Dinner: Usually go to mexican restaurants like [employer] or [employer]  -chicken tacos or chicken burrito bowl with rice and beans with guacamole and chips, may share the meal depending upon appetite.    Now splitting the restaurant meal with her .          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 Pt reports her eating habits at work vary greatly, may or may not have snacks/meals there, typically relies on convenient foods from vending machine or takeout. Eats dinner out most every night during the week.     Updated assessment (Follow up note  "only):  Pt has successfully lost 5lb from previous appointment (~.5lb weight loss per week). Pt has been calorie tracking which she found helpful and manageable. \"I know I would lose more weight if I was exercising.\"     8/27/24: Pt lost 1lb since previous appointment. Pt feels her weight loss has hit a plateau and unsure how to lose more weight. Pt reports she has been tracking calorie intake though this is mostly a mental note, not utilizing tracking apps or pen/paper etc. ?accurately estimating calories on food items such as sandwiches/hoagies etc. Pt has not yet started walking or other additional physical activity, pt says she is too tired to do this after work.          Nutrition Diagnosis:   Overweight/obesity  related to Excess energy intake as evidenced by  BMI more than normative standard for age and sex (obesity-grade I 30-34.9)       Any change or new dx since previous visit:       Medical Nutrition Therapy Intervention:  [x]Individualized Meal Plan--Pt to continue with eating 3 meals per day.  []Understanding Lab Values   []Basic Pathophysiology of Disease []Food/Medication Interactions   [x]Food Diary--Discussed with pt maintaining goal of 1400 kcal at this time. Encourage utilizing tracking romaine to ensure accurate record of calorie intake, ?under estimating calorie intake.  [x]Exercise--Discussed benefits of including physical activity on a regular basis. Suggested exercise in the morning before work. Suspect physical inactivity contributing to difficulties with weight loss.    []Lifestyle/Behavior Modification Techniques []Medication, Mechanism of Action   []Label Reading:  []Self Blood Glucose Monitoring   [x]Weight/BMI Goals: Goal of 0.5 - 1 lb weight loss per week. Of note, pt has transitioned from obesity to overweight category for her BMI.  [x]Other -     Handouts previously provided: 1400 kcal sample meal plan, Nonstarchy vegetables food list, 6 Week Beginner Walking Plan, Recipes for: Roasted " "Veggies, Grilled Cabbage Steaks, Gyros, Quick Lunch Ideas, almond davis date candies          Comprehension: []Excellent  []Very Good  [x]Good  []Fair   []Poor    Receptivity: []Excellent  []Very Good  [x]Good  []Fair   []Poor    Expected Compliance: []Excellent  []Very Good  [x]Good  []Fair   []Poor        Goals (initial)/ Progress made on previous goals/new goals:  Pt to continue to lose 0.5 - 1 lb per week upon follow up.--not met, extended   2. Pt to limit snack options at work to 200 kcal each day upon follow up. --met   3. Pt to consume 1400 kcal per day while utilizing tracking romaine for improved accuracy on daily basis. --new goal        No follow-ups on file.  Labs:  CMP  Lab Results   Component Value Date    K 3.8 11/01/2018     11/01/2018    CO2 23 11/01/2018    BUN 9 11/01/2018    CREATININE 0.75 11/01/2018    GLUF 84 11/01/2018    CALCIUM 8.7 11/01/2018    AST 42 03/01/2018    ALT 82 (H) 03/01/2018    ALKPHOS 115 03/01/2018    EGFR 103 11/01/2018       BMP  Lab Results   Component Value Date    CALCIUM 8.7 11/01/2018    K 3.8 11/01/2018    CO2 23 11/01/2018     11/01/2018    BUN 9 11/01/2018    CREATININE 0.75 11/01/2018       Lipids  No results found for: \"CHOL\"  Lab Results   Component Value Date    HDL 61 12/23/2019     Lab Results   Component Value Date    LDLCALC 93 12/23/2019     Lab Results   Component Value Date    TRIG 48 12/23/2019     No results found for: \"CHOLHDL\"    Hemoglobin A1C  No results found for: \"HGBA1C\"    Fasting Glucose  Lab Results   Component Value Date    GLUF 84 11/01/2018       Insulin     Thyroid  No results found for: \"TSH\", \"S4EYRTV\", \"K7GETCP\", \"THYROIDAB\"    Hepatic Function Panel  Lab Results   Component Value Date    ALT 82 (H) 03/01/2018    AST 42 03/01/2018    ALKPHOS 115 03/01/2018       Celiac Disease Antibody Panel  No results found for: \"ENDOMYSIAL IGA\", \"GLIADIN IGA\", \"GLIADIN IGG\", \"IGA\", \"TISSUE TRANSGLUT AB\", \"TTG IGA\"   Iron  Lab Results "   Component Value Date    IRON 80 11/01/2018    TIBC 421 11/01/2018    FERRITIN 7 (L) 11/01/2018            Nicole Thorpe RD, LDN  [facility] CLINICAL NUTRITION SERVICES  [address] [facility] [address] [place] [id number]

## 2024-08-27 ENCOUNTER — CLINICAL SUPPORT (OUTPATIENT)
Dept: NUTRITION | Facility: CLINIC | Age: 44
End: 2024-08-27
Payer: COMMERCIAL

## 2024-08-27 VITALS — BODY MASS INDEX: 29.94 KG/M2 | WEIGHT: 169 LBS

## 2024-08-27 DIAGNOSIS — E66.09 CLASS 1 OBESITY DUE TO EXCESS CALORIES WITHOUT SERIOUS COMORBIDITY WITH BODY MASS INDEX (BMI) OF 33.0 TO 33.9 IN ADULT: Primary | ICD-10-CM

## 2024-08-27 PROCEDURE — 97803 MED NUTRITION INDIV SUBSEQ: CPT | Performed by: DIETITIAN, REGISTERED

## 2024-10-07 NOTE — PROGRESS NOTES
" Nutrition Assessment Form    Patient Name: Anyi Friend    YOB: 1980    Sex: Female     Assessment Date: 10/7/2024  Start Time: 2:28 PM Stop Time: 3 PM Total Minutes: 32 min     Data:  Present at session: self   Parent/Patient Concerns/reason for visit: \"It has been stressful.\"    Medical Dx/Reason for Referral: Obesity   Past Medical History:   Diagnosis Date    2019 novel coronavirus disease (COVID-19) 12/28/2020    Known health problems: none     Psoriasis        Current Outpatient Medications   Medication Sig Dispense Refill    omeprazole (PriLOSEC) 20 mg delayed release capsule TAKE 1 CAPSULE BY MOUTH DAILY BEFORE BREAKFAST. 90 capsule 1    SUMAtriptan (IMITREX) 50 mg tablet TAKE 1 TABLET BY MOUTH ONCE AS NEEDED FOR MIGRAINE FOR UP TO 1 DOSE. 9 tablet 5     No current facility-administered medications for this visit.        Additional Meds/Supplements: Women's one a day, takes iron pills occasionally, sometimes will have constipation with this, bloated from constipation    Special Learning Needs/barriers to learning/any new barriers N/a   Height: HC Readings from Last 5 Encounters:   No data found for HC      Weight: Wt Readings from Last 10 Encounters:   08/27/24 76.7 kg (169 lb)   08/12/24 76.8 kg (169 lb 6.4 oz)   07/25/24 77.5 kg (170 lb 12.8 oz)   05/30/24 79.4 kg (175 lb)   05/28/24 79.8 kg (176 lb)   05/13/24 79.9 kg (176 lb 3.2 oz)   02/05/24 80.3 kg (177 lb)   12/04/23 79.6 kg (175 lb 6.4 oz)   12/04/23 78.9 kg (174 lb)   11/22/23 73 kg (160 lb 15 oz)     Estimated body mass index is 29.94 kg/m² as calculated from the following:    Height as of 8/12/24: 5' 3\" (1.6 m).    Weight as of 8/27/24: 76.7 kg (169 lb).   Recent Weight Change: [x]Yes     []No  Amount: Successful weight loss from initial appointment 5/30/24 175lb. Down 9lb total.       Energy Needs: [place]- . Jeor Equation: 1400 kcal (mifflin x 1.3 activity minus 500 for 1 lb weight loss per day)   No Known Allergies or " "intolerances Doesn't care for fish apart from breaded shrimp, prefers poultry, strongly dislikes Dana sprouts    Social History     Substance and Sexual Activity   Alcohol Use Yes    Couple times per month such as at a party   Social History     Tobacco Use   Smoking Status Never   Smokeless Tobacco Never    None    Who shops? spouse   Who cooks/cooking methods/Eating out/take out habits   spouse or eat out  Cooking methods: bake/bermudez/air bermudez/grill/boiil--variety of methods,  follows gluten free diet and pt follows this too     Take out: At lunch for work    Dining out Most dinners will eat out   Exercise: Works as , sedentary job      Other: ie: Sleep habits/ stress level/ work habits household-lives with ?/ food security No food insecurity    5 hours sleep most nights, wakes up early at 3:30-4 AM, sleep isn't consistent   Prior Nutritional Counseling? []Yes     [x]No  When:      Why:         Diet Hx:  Breakfast: Diet: n/a  Granola bar or yogurt          Lunch: [place] made at home or just yogurt if didn't have time          Dinner: Usually go to mexican restaurants like [employer] or [employer]  -chicken tacos or chicken burrito bowl with rice and beans with guacamole and chips, may share the meal depending upon appetite.    Now splitting the restaurant meal with her .      recently started making dinner meals for pt such as homemade chicken noodle soup or \"meat loaf [place] dav[place]\" with potatoes/green beans.          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 Pt reports her eating habits at work vary greatly, may or may not have snacks/meals " "there, typically relies on convenient foods from vending machine or takeout. Eats dinner out most every night during the week.     Updated assessment (Follow up note only):  Pt has successfully lost 5lb from previous appointment (~.5lb weight loss per week). Pt has been calorie tracking which she found helpful and manageable. \"I know I would lose more weight if I was exercising.\"     8/27/24: Pt lost 1lb since previous appointment. Pt feels her weight loss has hit a plateau and unsure how to lose more weight. Pt reports she has been tracking calorie intake though this is mostly a mental note, not utilizing tracking apps or pen/paper etc. ?accurately estimating calories on food items such as sandwiches/hoagies etc. Pt has not yet started walking or other additional physical activity, pt says she is too tired to do this after work.     10/8/24: Pt has lost 3lb from previous appointment. Pt reports increased stress at work, has not found time outside of work to go walking/exercising. Has not been utilizing calorie tracking apps, finds them too difficult to use, more interested in pen/paper tracking.          Nutrition Diagnosis:   Overweight/obesity  related to Excess energy intake as evidenced by  BMI more than normative standard for age and sex (obesity-grade I 30-34.9)       Any change or new dx since previous visit:       Medical Nutrition Therapy Intervention:  []Individualized Meal Plan []Understanding Lab Values   []Basic Pathophysiology of Disease []Food/Medication Interactions   [x]Food Diary--Discussed utilizing pen/paper to track calories, goal of 1400 kcal per day. Pt to utilize provided handout as reference for estimated kcal content.  [x]Exercise--Discussed with pt importance of including physical activity. Discussed difference between urgent tasks (time deadlines) and important tasks (self care). Discussed having intentional 15 minute break from her desk to go walking, concern of pt sitting for very long " "periods of time at sedentary desk job.    []Lifestyle/Behavior Modification Techniques []Medication, Mechanism of Action   []Label Reading:  []Self Blood Glucose Monitoring   [x]Weight/BMI Goals: Goal of 0.5 - 1 lb weight loss per week. Of note, pt has transitioned from obesity to overweight category for her BMI.  [x]Other -     Handouts provided today: Recipes for homemade gyros, roasted vegetables, grilled cabbage steaks, Choose Your Foods List (for kcal content)    Handouts previously provided: 1400 kcal sample meal plan, Nonstarchy vegetables food list, 6 Week Beginner Walking Plan, Recipes for: Roasted Veggies, Grilled Cabbage Steaks, Gyros, Quick Lunch Ideas, almond davis date candies          Comprehension: []Excellent  []Very Good  [x]Good  []Fair   []Poor    Receptivity: []Excellent  []Very Good  [x]Good  []Fair   []Poor    Expected Compliance: []Excellent  []Very Good  [x]Good  []Fair   []Poor        Goals (initial)/ Progress made on previous goals/new goals:  Pt to continue to lose 0.5 - 1 lb per week upon follow up.--met, extended   2. Pt to limit snack options at work to 200 kcal each day upon follow up. --met, extended   3. Pt to consume 1400 kcal per day while utilizing pen/paper calorie tracking --revised  4. Pt to taking at least 10-15 minute walk at work 3x per week upon follow up. --new goal       No follow-ups on file.  Labs:  CMP  Lab Results   Component Value Date    K 3.8 11/01/2018     11/01/2018    CO2 23 11/01/2018    BUN 9 11/01/2018    CREATININE 0.75 11/01/2018    GLUF 84 11/01/2018    CALCIUM 8.7 11/01/2018    AST 42 03/01/2018    ALT 82 (H) 03/01/2018    ALKPHOS 115 03/01/2018    EGFR 103 11/01/2018       BMP  Lab Results   Component Value Date    CALCIUM 8.7 11/01/2018    K 3.8 11/01/2018    CO2 23 11/01/2018     11/01/2018    BUN 9 11/01/2018    CREATININE 0.75 11/01/2018       Lipids  No results found for: \"CHOL\"  Lab Results   Component Value Date    HDL 61 12/23/2019 " "    Lab Results   Component Value Date    LDLCALC 93 12/23/2019     Lab Results   Component Value Date    TRIG 48 12/23/2019     No results found for: \"CHOLHDL\"    Hemoglobin A1C  No results found for: \"HGBA1C\"    Fasting Glucose  Lab Results   Component Value Date    GLUF 84 11/01/2018       Insulin     Thyroid  No results found for: \"TSH\", \"N0EVDME\", \"U6VLMDO\", \"THYROIDAB\"    Hepatic Function Panel  Lab Results   Component Value Date    ALT 82 (H) 03/01/2018    AST 42 03/01/2018    ALKPHOS 115 03/01/2018       Celiac Disease Antibody Panel  No results found for: \"ENDOMYSIAL IGA\", \"GLIADIN IGA\", \"GLIADIN IGG\", \"IGA\", \"TISSUE TRANSGLUT AB\", \"TTG IGA\"   Iron  Lab Results   Component Value Date    IRON 80 11/01/2018    TIBC 421 11/01/2018    FERRITIN 7 (L) 11/01/2018            Nicole Thorpe, SARBJIT, LDN  [facility] CLINICAL NUTRITION SERVICES  [address] [facility] [address] [place] [id number]    "

## 2024-10-08 ENCOUNTER — CLINICAL SUPPORT (OUTPATIENT)
Dept: NUTRITION | Facility: CLINIC | Age: 44
End: 2024-10-08
Payer: COMMERCIAL

## 2024-10-08 VITALS — WEIGHT: 166 LBS | BODY MASS INDEX: 29.41 KG/M2

## 2024-10-08 DIAGNOSIS — E66.811 CLASS 1 OBESITY DUE TO EXCESS CALORIES WITHOUT SERIOUS COMORBIDITY WITH BODY MASS INDEX (BMI) OF 33.0 TO 33.9 IN ADULT: Primary | ICD-10-CM

## 2024-10-08 DIAGNOSIS — E66.09 CLASS 1 OBESITY DUE TO EXCESS CALORIES WITHOUT SERIOUS COMORBIDITY WITH BODY MASS INDEX (BMI) OF 33.0 TO 33.9 IN ADULT: Primary | ICD-10-CM

## 2024-10-08 PROCEDURE — 97803 MED NUTRITION INDIV SUBSEQ: CPT | Performed by: DIETITIAN, REGISTERED

## 2024-10-21 ENCOUNTER — OFFICE VISIT (OUTPATIENT)
Dept: FAMILY MEDICINE CLINIC | Facility: CLINIC | Age: 44
End: 2024-10-21
Payer: COMMERCIAL

## 2024-10-21 VITALS
BODY MASS INDEX: 29.59 KG/M2 | DIASTOLIC BLOOD PRESSURE: 68 MMHG | WEIGHT: 167 LBS | OXYGEN SATURATION: 99 % | HEIGHT: 63 IN | SYSTOLIC BLOOD PRESSURE: 118 MMHG | HEART RATE: 69 BPM

## 2024-10-21 DIAGNOSIS — E66.811 CLASS 1 OBESITY DUE TO EXCESS CALORIES WITHOUT SERIOUS COMORBIDITY WITH BODY MASS INDEX (BMI) OF 33.0 TO 33.9 IN ADULT: ICD-10-CM

## 2024-10-21 DIAGNOSIS — Z12.4 SCREENING FOR CERVICAL CANCER: Primary | ICD-10-CM

## 2024-10-21 DIAGNOSIS — E66.09 CLASS 1 OBESITY DUE TO EXCESS CALORIES WITHOUT SERIOUS COMORBIDITY WITH BODY MASS INDEX (BMI) OF 33.0 TO 33.9 IN ADULT: ICD-10-CM

## 2024-10-21 PROCEDURE — 99213 OFFICE O/P EST LOW 20 MIN: CPT | Performed by: INTERNAL MEDICINE

## 2024-10-21 NOTE — PROGRESS NOTES
Ambulatory Visit  Name: Anyi Friend      : 1980      MRN: [id number]  Encounter Provider: Delvis Langley MD  Encounter Date: 10/21/2024   Encounter department: [facility]    Assessment & Plan  1. Obesity.  Her weight has decreased from 169 to 167 pounds, indicating slow progress. Her body mass index has improved, falling below the obesity threshold. She aims to reach an ideal weight of around 140 pounds. She was advised to monitor her caloric intake, aiming for no more than 1200 to 1500 calories per day. A diet rich in lean proteins, such as skinless chicken breast and tuna, was recommended. She was also encouraged to measure her food portions, particularly high-calorie items like peanut butter and oil, and to focus on whole foods over packaged snacks. Exercise, such as walking, was recommended as a supplementary activity to aid weight loss.  We did discuss weight loss medications but she is still reluctant to use them at this time.    2. Health Maintenance.  She is due for a mammogram, as her last one was in December of the previous year. She was also reminded to schedule a Pap smear.    Follow-up  Return in 2025 for follow-up.         History of Present Illness     History of Present Illness  The patient is a 43-year-old female who presents for weight management.    She has been actively participating in a weight management program, having attended two sessions since her last visit. She reports a weight loss of 3 pounds, bringing her current weight to 167 pounds. Her goal is to reach a weight of 130 pounds. Despite her efforts, she has been unable to incorporate exercise into her routine. Her diet primarily consists of chicken, with occasional consumption of hoagies, approximately once a week. She uses extra light oil for cooking and snacks on fruits and vegetables. Her morning beverage of choice is coffee with almond milk creamer or [place] vanilla creamer.    She has  "undergone several mammograms and an ultrasound, all of which have returned benign results. She does not currently have a gynecologist and has not scheduled a Pap smear.       Objective     /68 (BP Location: Right arm, Patient Position: Sitting, Cuff Size: Large)   Pulse 69   Ht 5' 3\" (1.6 m)   Wt 75.8 kg (167 lb)   SpO2 99%   BMI 29.58 kg/m²     Wt Readings from Last 3 Encounters:   10/21/24 75.8 kg (167 lb)   10/08/24 75.3 kg (166 lb)   08/27/24 76.7 kg (169 lb)      Physical Exam    Physical Exam  Constitutional:       General: She is not in acute distress.     Appearance: Normal appearance. She is not ill-appearing.   Neurological:      Mental Status: She is alert.         "

## 2024-11-18 NOTE — PROGRESS NOTES
" Nutrition Assessment Form    Patient Name: Anyi Friend    YOB: 1980    Sex: Female     Assessment Date: 11/18/2024  Start Time: 3:45 PM Stop Time: 4 PM Total Minutes: 15 min     Data:  Present at session: self   Parent/Patient Concerns/reason for visit: Pt pleased with weight loss.    Medical Dx/Reason for Referral: Obesity   Past Medical History:   Diagnosis Date    2019 novel coronavirus disease (COVID-19) 12/28/2020    Known health problems: none     Psoriasis        Current Outpatient Medications   Medication Sig Dispense Refill    omeprazole (PriLOSEC) 20 mg delayed release capsule TAKE 1 CAPSULE BY MOUTH DAILY BEFORE BREAKFAST. 90 capsule 1    SUMAtriptan (IMITREX) 50 mg tablet TAKE 1 TABLET BY MOUTH ONCE AS NEEDED FOR MIGRAINE FOR UP TO 1 DOSE. 9 tablet 5     No current facility-administered medications for this visit.        Additional Meds/Supplements: Women's one a day, takes iron pills occasionally, sometimes will have constipation with this, bloated from constipation    Special Learning Needs/barriers to learning/any new barriers N/a   Height: HC Readings from Last 5 Encounters:   No data found for HC      Weight: Wt Readings from Last 10 Encounters:   10/21/24 75.8 kg (167 lb)   10/08/24 75.3 kg (166 lb)   08/27/24 76.7 kg (169 lb)   08/12/24 76.8 kg (169 lb 6.4 oz)   07/25/24 77.5 kg (170 lb 12.8 oz)   05/30/24 79.4 kg (175 lb)   05/28/24 79.8 kg (176 lb)   05/13/24 79.9 kg (176 lb 3.2 oz)   02/05/24 80.3 kg (177 lb)   12/04/23 79.6 kg (175 lb 6.4 oz)     Estimated body mass index is 29.58 kg/m² as calculated from the following:    Height as of 10/21/24: 5' 3\" (1.6 m).    Weight as of 10/21/24: 75.8 kg (167 lb).   Recent Weight Change: [x]Yes     []No  Amount: Successful weight loss from initial appointment 5/30/24 175lb. Down 9lb total.       Energy Needs: [place]- . Andior Equation: 1400 kcal (mifflin x 1.3 activity minus 500 for 1 lb weight loss per day)   No Known Allergies or " "intolerances Doesn't care for fish apart from breaded shrimp, prefers poultry, strongly dislikes [place] sprouts    Social History     Substance and Sexual Activity   Alcohol Use Yes    Couple times per month such as at a party   Social History     Tobacco Use   Smoking Status Never   Smokeless Tobacco Never    None    Who shops? spouse   Who cooks/cooking methods/Eating out/take out habits   spouse or eat out  Cooking methods: bake/bermudez/air bermudez/grill/boiil--variety of methods,  follows gluten free diet and pt follows this too     Take out: At lunch for work    Dining out Most dinners will eat out   Exercise: Works as , sedentary job      Other: ie: Sleep habits/ stress level/ work habits household-lives with ?/ food security No food insecurity    5 hours sleep most nights, wakes up early at 3:30-4 AM, sleep isn't consistent   Prior Nutritional Counseling? []Yes     [x]No  When:      Why:         Diet Hx:  Breakfast: Diet: n/a  Granola bar or yogurt          Lunch: [place] made at home or just yogurt if didn't have time          Dinner: Usually go to mexican restaurants like [employer] or [employer]  -chicken tacos or chicken burrito bowl with rice and beans with guacamole and chips, may share the meal depending upon appetite.    Now splitting the restaurant meal with her .      recently started making dinner meals for pt such as homemade chicken noodle soup or \"meat loaf [place]id\" with potatoes/green beans.          Snacks: AM -   PM - none, typically  HS - decreased frequency of desserts at home     Drinks coffee with almond creamer, at [facility] would have coffee with whole milk and caramel swirl, may have 2 cups per day, drinks diet coke or coke zero or hot tea   Other Notes/ Initial Assessment:  Pt has sedentary desk job, 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 Pt reports her eating habits at work vary greatly, may or may not have snacks/meals " "there, typically relies on convenient foods from vending machine or takeout. Eats dinner out most every night during the week.     Updated assessment (Follow up note only):  Pt has successfully lost 5lb from previous appointment (~.5lb weight loss per week). Pt has been calorie tracking which she found helpful and manageable. \"I know I would lose more weight if I was exercising.\"     8/27/24: Pt lost 1lb since previous appointment. Pt feels her weight loss has hit a plateau and unsure how to lose more weight. Pt reports she has been tracking calorie intake though this is mostly a mental note, not utilizing tracking apps or pen/paper etc. ?accurately estimating calories on food items such as sandwiches/hoagies etc. Pt has not yet started walking or other additional physical activity, pt says she is too tired to do this after work.     10/8/24: Pt has lost 3lb from previous appointment. Pt reports increased stress at work, has not found time outside of work to go walking/exercising. Has not been utilizing calorie tracking apps, finds them too difficult to use, more interested in pen/paper tracking.     11/19/24: Pt with continued slight weight loss. Pt would like to see further weight loss and inquiring what she can do to see further results.          Nutrition Diagnosis:   Overweight/obesity  related to Excess energy intake as evidenced by  BMI more than normative standard for age and sex (obesity-grade I 30-34.9)       Any change or new dx since previous visit:       Medical Nutrition Therapy Intervention:  [x]Individualized Meal Plan--Pt inquired about weight watchers program, discussed pros/cons to program and similarity to calorie tracking.  []Understanding Lab Values   []Basic Pathophysiology of Disease []Food/Medication Interactions   [x]Food Diary--Pt to utilize provided handout as reference for estimated kcal content, 1400 kcal per day.  [x]Exercise--Again emphasized importance of physical activity. Pt to " trial different exercise types prior to next appointment with her  such as walking in her neighborhood, yoga studio class, gym equipment, indoor swim etc and see which is most enjoyable/best for their schedule.    []Lifestyle/Behavior Modification Techniques []Medication, Mechanism of Action   []Label Reading:  []Self Blood Glucose Monitoring   [x]Weight/BMI Goals: Goal of 0.5 - 1 lb weight loss per week. Pt has successfully transitioned form obesity class 1 category to overweight category. Discussed goal weight of 140lb which would provide BMI 24.9/normal BMI.  [x]Other -       Handouts previously provided: 1400 kcal sample meal plan, Nonstarchy vegetables food list, 6 Week Beginner Walking Plan, Recipes for: Roasted Veggies, Grilled Cabbage Steaks, Gyros, Quick Lunch Ideas, almond davis date candies, Recipes for homemade gyros, roasted vegetables, grilled cabbage steaks, Choose Your Foods List (for kcal content)          Comprehension: []Excellent  []Very Good  [x]Good  []Fair   []Poor    Receptivity: []Excellent  []Very Good  [x]Good  []Fair   []Poor    Expected Compliance: []Excellent  []Very Good  [x]Good  []Fair   []Poor        Goals (initial)/ Progress made on previous goals/new goals:  Pt to continue to lose 0.5 - 1 lb per week upon follow up.--met, extended   2. Pt to limit snack options at work to 200 kcal each day upon follow up. --met, extended   3. Pt to consume 1400 kcal per day while utilizing pen/paper calorie tracking --revised  4. Pt to trial at least 2 types of intentional exercise prior to next appointment. --revised       No follow-ups on file.  Labs:  CMP  Lab Results   Component Value Date    K 3.8 11/01/2018     11/01/2018    CO2 23 11/01/2018    BUN 9 11/01/2018    CREATININE 0.75 11/01/2018    GLUF 84 11/01/2018    CALCIUM 8.7 11/01/2018    AST 42 03/01/2018    ALT 82 (H) 03/01/2018    ALKPHOS 115 03/01/2018    EGFR 103 11/01/2018       BMP  Lab Results   Component Value Date     "CALCIUM 8.7 11/01/2018    K 3.8 11/01/2018    CO2 23 11/01/2018     11/01/2018    BUN 9 11/01/2018    CREATININE 0.75 11/01/2018       Lipids  No results found for: \"CHOL\"  Lab Results   Component Value Date    HDL 61 12/23/2019     Lab Results   Component Value Date    LDLCALC 93 12/23/2019     Lab Results   Component Value Date    TRIG 48 12/23/2019     No results found for: \"CHOLHDL\"    Hemoglobin A1C  No results found for: \"HGBA1C\"    Fasting Glucose  Lab Results   Component Value Date    GLUF 84 11/01/2018       Insulin     Thyroid  No results found for: \"TSH\", \"J7BEJPA\", \"W8FCWCZ\", \"THYROIDAB\"    Hepatic Function Panel  Lab Results   Component Value Date    ALT 82 (H) 03/01/2018    AST 42 03/01/2018    ALKPHOS 115 03/01/2018       Celiac Disease Antibody Panel  No results found for: \"ENDOMYSIAL IGA\", \"GLIADIN IGA\", \"GLIADIN IGG\", \"IGA\", \"TISSUE TRANSGLUT AB\", \"TTG IGA\"   Iron  Lab Results   Component Value Date    IRON 80 11/01/2018    TIBC 421 11/01/2018    FERRITIN 7 (L) 11/01/2018            Nicole Thorpe RD, LDN  [facility]UTR[facility]  [address] [place] [address]    "

## 2024-11-19 ENCOUNTER — CLINICAL SUPPORT (OUTPATIENT)
Dept: NUTRITION | Facility: CLINIC | Age: 44
End: 2024-11-19
Payer: COMMERCIAL

## 2024-11-19 VITALS — WEIGHT: 165.4 LBS | BODY MASS INDEX: 29.3 KG/M2

## 2024-11-19 DIAGNOSIS — E66.09 CLASS 1 OBESITY DUE TO EXCESS CALORIES WITHOUT SERIOUS COMORBIDITY WITH BODY MASS INDEX (BMI) OF 33.0 TO 33.9 IN ADULT: Primary | ICD-10-CM

## 2024-11-19 DIAGNOSIS — E66.811 CLASS 1 OBESITY DUE TO EXCESS CALORIES WITHOUT SERIOUS COMORBIDITY WITH BODY MASS INDEX (BMI) OF 33.0 TO 33.9 IN ADULT: Primary | ICD-10-CM

## 2024-11-19 PROCEDURE — 97803 MED NUTRITION INDIV SUBSEQ: CPT | Performed by: DIETITIAN, REGISTERED

## 2024-11-26 ENCOUNTER — TELEPHONE (OUTPATIENT)
Dept: SURGICAL ONCOLOGY | Facility: CLINIC | Age: 44
End: 2024-11-26

## 2024-11-26 ENCOUNTER — HOSPITAL ENCOUNTER (OUTPATIENT)
Dept: RADIOLOGY | Facility: CLINIC | Age: 44
Discharge: HOME | End: 2024-11-26
Payer: COMMERCIAL

## 2024-11-26 VITALS — HEIGHT: 63 IN | WEIGHT: 165 LBS | BODY MASS INDEX: 29.23 KG/M2

## 2024-11-26 DIAGNOSIS — Z12.31 VISIT FOR SCREENING MAMMOGRAM: ICD-10-CM

## 2024-11-26 PROCEDURE — 77067 SCR MAMMO BI INCL CAD: CPT

## 2024-11-26 PROCEDURE — 77063 BREAST TOMOSYNTHESIS BI: CPT

## 2024-11-26 NOTE — TELEPHONE ENCOUNTER
Left voicemail for patient in regards to rescheduling appointment with Juan M due to Juan M being out of the office that day. Made patient aware that Juan M is booked out till after June and if they want to be seen sooner they may see our NP Florecita or Kamilah. Provided patient with the hopeline number.   MD Derek

## 2024-12-18 DIAGNOSIS — K21.9 GASTROESOPHAGEAL REFLUX DISEASE, UNSPECIFIED WHETHER ESOPHAGITIS PRESENT: ICD-10-CM

## 2025-02-28 ENCOUNTER — RA CDI HCC (OUTPATIENT)
Dept: OTHER | Facility: HOSPITAL | Age: 45
End: 2025-02-28

## 2025-03-03 ENCOUNTER — OFFICE VISIT (OUTPATIENT)
Dept: FAMILY MEDICINE CLINIC | Facility: CLINIC | Age: 45
End: 2025-03-03
Payer: COMMERCIAL

## 2025-03-03 VITALS
OXYGEN SATURATION: 99 % | HEIGHT: 63 IN | BODY MASS INDEX: 29.59 KG/M2 | DIASTOLIC BLOOD PRESSURE: 68 MMHG | SYSTOLIC BLOOD PRESSURE: 110 MMHG | WEIGHT: 167 LBS | HEART RATE: 76 BPM

## 2025-03-03 DIAGNOSIS — E66.811 CLASS 1 OBESITY DUE TO EXCESS CALORIES WITHOUT SERIOUS COMORBIDITY WITH BODY MASS INDEX (BMI) OF 33.0 TO 33.9 IN ADULT: ICD-10-CM

## 2025-03-03 DIAGNOSIS — E66.09 CLASS 1 OBESITY DUE TO EXCESS CALORIES WITHOUT SERIOUS COMORBIDITY WITH BODY MASS INDEX (BMI) OF 33.0 TO 33.9 IN ADULT: ICD-10-CM

## 2025-03-03 DIAGNOSIS — G93.31 POSTVIRAL SYNDROME: Primary | ICD-10-CM

## 2025-03-03 PROCEDURE — 99396 PREV VISIT EST AGE 40-64: CPT | Performed by: INTERNAL MEDICINE

## 2025-03-03 PROCEDURE — 99214 OFFICE O/P EST MOD 30 MIN: CPT | Performed by: INTERNAL MEDICINE

## 2025-03-03 RX ORDER — BENZONATATE 100 MG/1
100 CAPSULE ORAL 3 TIMES DAILY PRN
Qty: 20 CAPSULE | Refills: 0 | Status: SHIPPED | OUTPATIENT
Start: 2025-03-03

## 2025-03-03 RX ORDER — PREDNISONE 20 MG/1
40 TABLET ORAL DAILY
Qty: 10 TABLET | Refills: 0 | Status: SHIPPED | OUTPATIENT
Start: 2025-03-03 | End: 2025-03-08

## 2025-03-03 NOTE — PROGRESS NOTES
Name: Anyi Friend      : 1980      MRN: [id number]  Encounter Provider: Delvis Langley MD  Encounter Date: 3/3/2025   Encounter department: [facility]    Assessment & Plan  Postviral syndrome  The cough may be a residual symptom from a viral laryngitis infection, causing irritation in the airway. A short course of prednisone 40 mg daily for 5 days will be initiated to reduce inflammation. Additionally, benzonatate 100 mg will be taken 3 times a day as needed for cough suppression. If the cough persists beyond the next couple of weeks, a chest x-ray will be considered.  Orders:    benzonatate (TESSALON PERLES) 100 mg capsule; Take 1 capsule (100 mg total) by mouth 3 (three) times a day as needed for cough    predniSONE 20 mg tablet; Take 2 tablets (40 mg total) by mouth daily for 5 days    Class 1 obesity due to excess calories without serious comorbidity with body mass index (BMI) of 33.0 to 33.9 in adult  She wants to continue to work on her weight with diet alone.  She has a follow-up with nutrition next week.       Health maintenance.  Her weight has remained stable since her last visit. She had a Pap smear in  and a mammogram in 2024.        History of Present Illness     History of Present Illness  The patient presents for a physical exam and evaluation of persistent cough.    She reports a persistent cough that has been ongoing for approximately one month. The cough is predominantly dry and occurs more frequently at night, although it can also manifest during the day. She recalls an illness last month that began with a sore throat and subsequent hoarseness, but she did not experience any other symptoms or fever. Despite attempts to alleviate the cough with over-the-counter medications such as Mucinex DM, DayQuil, NyQuil, Airborne, tea with honey, and cough drops, the cough remains unresolved.    She has been maintaining a regular diet and has expressed interest in  incorporating exercise into her routine. She had previously planned to attend weight management sessions but had to cancel due to her illness. She intends to reschedule these appointments. She has been under the care of her gynecologist, Dr. Deleon, and had a Pap smear conducted in 2023. She is uncertain about the results of the Pap smear. She maintains regular eye examinations and dental check-ups. She had a mammogram performed in November 2024.    FAMILY HISTORY  Her paternal grandmother had breast cancer.        Past Medical History:   Diagnosis Date    2019 novel coronavirus disease (COVID-19) 12/28/2020    Known health problems: none     Psoriasis      Past Surgical History:   Procedure Laterality Date    NO PAST SURGERIES       Family History   Problem Relation Age of Onset    Diabetes Mother     Diabetes type II Mother         without complication     Diabetes type II Father         without complication     Cancer Father     No Known Problems Maternal Grandmother     No Known Problems Maternal Grandfather     Breast cancer Paternal Grandmother     No Known Problems Paternal Grandfather     No Known Problems Paternal Aunt     Breast cancer additional onset Neg Hx     BRCA2 Positive Neg Hx     BRCA2 Negative Neg Hx     BRCA1 Positive Neg Hx     BRCA1 Negative Neg Hx     BRCA 1/2 Neg Hx     Endometrial cancer Neg Hx     Colon cancer Neg Hx     Ovarian cancer Neg Hx      Social History     Tobacco Use    Smoking status: Never    Smokeless tobacco: Never   Vaping Use    Vaping status: Never Used   Substance and Sexual Activity    Alcohol use: Yes    Drug use: Never    Sexual activity: Yes     Partners: Male     Birth control/protection: None     Current Outpatient Medications on File Prior to Visit   Medication Sig    omeprazole (PriLOSEC) 20 mg delayed release capsule TAKE 1 CAPSULE BY MOUTH EVERY DAY BEFORE BREAKFAST    SUMAtriptan (IMITREX) 50 mg tablet TAKE 1 TABLET BY MOUTH ONCE AS NEEDED FOR MIGRAINE FOR UP  "TO 1 DOSE.     No Known Allergies  Immunization History   Administered Date(s) Administered    DTP 01/12/1983, 03/21/1983, 05/06/1983, 06/06/1984, 07/29/1988    HiB 11/27/1985    Hib (PRP-T) 11/27/1985    IPV 03/21/1983, 04/18/1983, 07/15/1983, 06/06/1984, 07/29/1988    MMR 02/16/1984, 08/09/1996    Td (adult), Unspecified 10/11/2000    Tdap 01/27/2016, 01/27/2016    Tuberculin Skin Test-PPD Intradermal 01/27/2016     Objective   /68 (BP Location: Left arm, Patient Position: Sitting, Cuff Size: Standard)   Pulse 76   Ht 5' 3\" (1.6 m)   Wt 75.8 kg (167 lb)   SpO2 99%   BMI 29.58 kg/m²     Wt Readings from Last 10 Encounters:   03/03/25 75.8 kg (167 lb)   11/26/24 74.8 kg (165 lb)   11/19/24 75 kg (165 lb 6.4 oz)   10/21/24 75.8 kg (167 lb)   10/08/24 75.3 kg (166 lb)   08/27/24 76.7 kg (169 lb)   08/12/24 76.8 kg (169 lb 6.4 oz)   07/25/24 77.5 kg (170 lb 12.8 oz)   05/30/24 79.4 kg (175 lb)   05/28/24 79.8 kg (176 lb)      Physical Exam    Physical Exam  Vitals reviewed.   Constitutional:       General: She is not in acute distress.     Appearance: Normal appearance. She is well-developed. She is not ill-appearing.   HENT:      Head: Normocephalic and atraumatic.      Right Ear: Tympanic membrane and external ear normal.      Left Ear: Tympanic membrane and external ear normal.      Nose: Nose normal.      Mouth/Throat:      Mouth: Mucous membranes are moist.      Pharynx: Oropharynx is clear.   Eyes:      General: No scleral icterus.     Extraocular Movements: Extraocular movements intact.      Pupils: Pupils are equal, round, and reactive to light.   Neck:      Thyroid: No thyroid mass or thyromegaly.      Vascular: No JVD.   Cardiovascular:      Rate and Rhythm: Normal rate and regular rhythm.      Heart sounds: Normal heart sounds. No murmur heard.     No friction rub. No gallop.   Pulmonary:      Breath sounds: Normal breath sounds.   Abdominal:      General: Bowel sounds are normal. There is no " distension.      Palpations: Abdomen is soft. There is no mass.   Musculoskeletal:         General: No swelling.   Neurological:      Mental Status: She is alert.   Psychiatric:         Mood and Affect: Mood normal.

## 2025-03-03 NOTE — ASSESSMENT & PLAN NOTE
She wants to continue to work on her weight with diet alone.  She has a follow-up with nutrition next week.

## 2025-03-11 ENCOUNTER — CLINICAL SUPPORT (OUTPATIENT)
Dept: NUTRITION | Facility: CLINIC | Age: 45
End: 2025-03-11
Payer: COMMERCIAL

## 2025-03-11 VITALS — BODY MASS INDEX: 28.91 KG/M2 | WEIGHT: 163.2 LBS

## 2025-03-11 DIAGNOSIS — E66.09 CLASS 1 OBESITY DUE TO EXCESS CALORIES WITHOUT SERIOUS COMORBIDITY WITH BODY MASS INDEX (BMI) OF 33.0 TO 33.9 IN ADULT: Primary | ICD-10-CM

## 2025-03-11 DIAGNOSIS — E66.811 CLASS 1 OBESITY DUE TO EXCESS CALORIES WITHOUT SERIOUS COMORBIDITY WITH BODY MASS INDEX (BMI) OF 33.0 TO 33.9 IN ADULT: Primary | ICD-10-CM

## 2025-03-11 NOTE — PROGRESS NOTES
" Nutrition Assessment Form    Patient Name: Anyi Friend    YOB: 1980    Sex: Female     Assessment Date: 3/11/2025  Start Time: 3:05 PM Stop Time: 3:35 PM Total Minutes: 30 min     Data:  Present at session: self   Parent/Patient Concerns/reason for visit: \"I'm watching the calories.\"    Medical Dx/Reason for Referral: Obesity   Past Medical History:   Diagnosis Date    2019 novel coronavirus disease (COVID-19) 12/28/2020    Known health problems: none     Psoriasis        Current Outpatient Medications   Medication Sig Dispense Refill    benzonatate (TESSALON PERLES) 100 mg capsule Take 1 capsule (100 mg total) by mouth 3 (three) times a day as needed for cough 20 capsule 0    omeprazole (PriLOSEC) 20 mg delayed release capsule TAKE 1 CAPSULE BY MOUTH EVERY DAY BEFORE BREAKFAST 90 capsule 1    SUMAtriptan (IMITREX) 50 mg tablet TAKE 1 TABLET BY MOUTH ONCE AS NEEDED FOR MIGRAINE FOR UP TO 1 DOSE. 9 tablet 5     No current facility-administered medications for this visit.        Additional Meds/Supplements: Women's one a day, takes iron pills occasionally, sometimes will have constipation with this, bloated from constipation    Special Learning Needs/barriers to learning/any new barriers N/a   Height: HC Readings from Last 5 Encounters:   No data found for HC      Weight: Wt Readings from Last 10 Encounters:   03/03/25 75.8 kg (167 lb)   11/26/24 74.8 kg (165 lb)   11/19/24 75 kg (165 lb 6.4 oz)   10/21/24 75.8 kg (167 lb)   10/08/24 75.3 kg (166 lb)   08/27/24 76.7 kg (169 lb)   08/12/24 76.8 kg (169 lb 6.4 oz)   07/25/24 77.5 kg (170 lb 12.8 oz)   05/30/24 79.4 kg (175 lb)   05/28/24 79.8 kg (176 lb)     Estimated body mass index is 29.58 kg/m² as calculated from the following:    Height as of 3/3/25: 5' 3\" (1.6 m).    Weight as of 3/3/25: 75.8 kg (167 lb).   Recent Weight Change: [x]Yes     []No  Amount: Successful weight loss from initial appointment 5/30/24 175lb. Down 12lb total.       Energy " "Needs: [place]- [employer] Equation: 1400 kcal (mifflin x 1.3 activity minus 500 for 1 lb weight loss per day)   No Known Allergies or intolerances Doesn't care for fish apart from breaded shrimp, prefers poultry, strongly dislikes [place] sprouts    Social History     Substance and Sexual Activity   Alcohol Use Yes    Couple times per month such as at a party   Social History     Tobacco Use   Smoking Status Never   Smokeless Tobacco Never    None    Who shops? spouse   Who cooks/cooking methods/Eating out/take out habits   spouse or eat out  Cooking methods: bake/bermudez/air bermudez/grill/boiil--variety of methods,  follows gluten free diet and pt follows this too     Take out: At lunch for work    Dining out Most dinners will eat out   Exercise: Works as , sedentary job      Other: ie: Sleep habits/ stress level/ work habits household-lives with ?/ food security No food insecurity    5 hours sleep most nights, wakes up early at 3:30-4 AM, sleep isn't consistent   Prior Nutritional Counseling? []Yes     [x]No  When:      Why:         Diet Hx:  Breakfast: Diet: n/a  Granola bar or yogurt          Lunch: Orange made at home or just yogurt if didn't have time          Dinner: Usually go to mexican restaurants like [employer] or [employer]  -chicken [employer] or chicken burrito bowl with rice and beans with guacamole and chips, may share the meal depending upon appetite.    Now splitting the restaurant meal with her .      recently started making dinner meals for pt such as homemade chicken noodle soup or \"meat loaf cord[place] david\" with potatoes/green beans.          Snacks: AM -   PM - none, typically  HS - decreased frequency of desserts at home     Drinks coffee with almond creamer, at [facility] would have coffee with whole milk and caramel swirl, may have 2 cups per day, drinks diet coke or coke zero or hot tea   Other Notes/ Initial Assessment:  Pt has sedentary desk job, no additional intentional " "physical activity throughout the day apart from ADLs. Pt reports her eating habits at work vary greatly, may or may not have snacks/meals there, typically relies on convenient foods from vending machine or takeout. Eats dinner out most every night during the week.     Updated assessment (Follow up note only):  Pt has successfully lost 5lb from previous appointment (~.5lb weight loss per week). Pt has been calorie tracking which she found helpful and manageable. \"I know I would lose more weight if I was exercising.\"     8/27/24: Pt lost 1lb since previous appointment. Pt feels her weight loss has hit a plateau and unsure how to lose more weight. Pt reports she has been tracking calorie intake though this is mostly a mental note, not utilizing tracking apps or pen/paper etc. ?accurately estimating calories on food items such as sandwiches/hoagies etc. Pt has not yet started walking or other additional physical activity, pt says she is too tired to do this after work.     10/8/24: Pt has lost 3lb from previous appointment. Pt reports increased stress at work, has not found time outside of work to go walking/exercising. Has not been utilizing calorie tracking apps, finds them too difficult to use, more interested in pen/paper tracking.     11/19/24: Pt with continued slight weight loss. Pt would like to see further weight loss and inquiring what she can do to see further results.     3/11/25: Pt reports has been making a mental note of calories she is taking in, aiming for 1400 kcal per day, not writing this down currently though being mindful of calories she is eating at a given meal or snack. Pt has not yet started exercise, feels motivation is hindering her.          Nutrition Diagnosis:   Overweight/obesity  related to Excess energy intake as evidenced by  BMI more than normative standard for age and sex (obesity-grade I 30-34.9)       Any change or new dx since previous visit:       Medical Nutrition Therapy " Intervention:  [x]Individualized Meal Plan--Discussed use of pumpkin puree or butternut squash puree to dilute tomato sauce given reported reflux with tomato products when trying to increase vegetable intake.    []Understanding Lab Values   []Basic Pathophysiology of Disease []Food/Medication Interactions   [x]Food Diary--Pt to utilize provided handout as reference for estimated kcal content, 1400 kcal per day.  [x]Exercise--Again strongly emphasized importance of physical activity. Pt reports previously walking with friend at work.  has not necessarily been reliable to go walking together. Pt to reach out to friend to discuss walking at least 1-2x per week upon follow up. Discussed benefits of walking/physical activity. Encouraged having accountability partner such as friend at work to help with exercise compliance.    []Lifestyle/Behavior Modification Techniques []Medication, Mechanism of Action   []Label Reading:  []Self Blood Glucose Monitoring   [x]Weight/BMI Goals: Goal of 0.5 - 1 lb weight loss per week. Pt has successfully transitioned form obesity class 1 category to overweight category. Goal weight of 140lb which would provide BMI 24.9/normal BMI.     Pt's rate of weight loss is slow. -12lb in 10 mo. Encouraged physical activity to increase rate of weight loss.  [x]Other -       Handouts previously provided: 1400 kcal sample meal plan, Nonstarchy vegetables food list, 6 Week Beginner Walking Plan, Recipes for: Roasted Veggies, Grilled Cabbage Steaks, Gyros, Quick Lunch Ideas, almond davis date candies, Recipes for homemade gyros, roasted vegetables, grilled cabbage steaks, Choose Your Foods List (for kcal content)          Comprehension: []Excellent  []Very Good  [x]Good  []Fair   []Poor    Receptivity: []Excellent  []Very Good  [x]Good  []Fair   []Poor    Expected Compliance: []Excellent  []Very Good  [x]Good  []Fair   []Poor        Goals (initial)/ Progress made on previous goals/new goals:  Pt to  "continue to lose 0.5 - 1 lb per week upon follow up.--not met, extended (achieving weight loss though slowly)   2. Pt to limit snack options at work to 200 kcal each day upon follow up. --met, extended   3. Pt to plan walking 1-2x per week with friend upon follow up. --revised       No follow-ups on file.  Labs:  CMP  Lab Results   Component Value Date    K 3.8 11/01/2018     11/01/2018    CO2 23 11/01/2018    BUN 9 11/01/2018    CREATININE 0.75 11/01/2018    GLUF 84 11/01/2018    CALCIUM 8.7 11/01/2018    AST 42 03/01/2018    ALT 82 (H) 03/01/2018    ALKPHOS 115 03/01/2018    EGFR 103 11/01/2018       BMP  Lab Results   Component Value Date    CALCIUM 8.7 11/01/2018    K 3.8 11/01/2018    CO2 23 11/01/2018     11/01/2018    BUN 9 11/01/2018    CREATININE 0.75 11/01/2018       Lipids  No results found for: \"CHOL\"  Lab Results   Component Value Date    HDL 61 12/23/2019     Lab Results   Component Value Date    LDLCALC 93 12/23/2019     Lab Results   Component Value Date    TRIG 48 12/23/2019     No results found for: \"CHOLHDL\"    Hemoglobin A1C  No results found for: \"HGBA1C\"    Fasting Glucose  Lab Results   Component Value Date    GLUF 84 11/01/2018       Insulin     Thyroid  No results found for: \"TSH\", \"K4VREAQ\", \"Y2LTSBF\", \"THYROIDAB\"    Hepatic Function Panel  Lab Results   Component Value Date    ALT 82 (H) 03/01/2018    AST 42 03/01/2018    ALKPHOS 115 03/01/2018       Celiac Disease Antibody Panel  No results found for: \"ENDOMYSIAL IGA\", \"GLIADIN IGA\", \"GLIADIN IGG\", \"IGA\", \"TISSUE TRANSGLUT AB\", \"TTG IGA\"   Iron  Lab Results   Component Value Date    IRON 80 11/01/2018    TIBC 421 11/01/2018    FERRITIN 7 (L) 11/01/2018            Nicole Thorpe RD, LDN  [facility] CLINICAL NUTRITION SERVICES  [address]  [place] [id number]    "

## 2025-03-31 ENCOUNTER — APPOINTMENT (OUTPATIENT)
Dept: RADIOLOGY | Facility: CLINIC | Age: 45
End: 2025-03-31
Payer: COMMERCIAL

## 2025-03-31 ENCOUNTER — OFFICE VISIT (OUTPATIENT)
Dept: FAMILY MEDICINE CLINIC | Facility: CLINIC | Age: 45
End: 2025-03-31
Payer: COMMERCIAL

## 2025-03-31 VITALS
HEIGHT: 63 IN | WEIGHT: 161.4 LBS | OXYGEN SATURATION: 99 % | SYSTOLIC BLOOD PRESSURE: 108 MMHG | HEART RATE: 81 BPM | TEMPERATURE: 98.5 F | DIASTOLIC BLOOD PRESSURE: 62 MMHG | BODY MASS INDEX: 28.6 KG/M2

## 2025-03-31 DIAGNOSIS — G93.31 POSTVIRAL SYNDROME: ICD-10-CM

## 2025-03-31 PROCEDURE — 99213 OFFICE O/P EST LOW 20 MIN: CPT | Performed by: INTERNAL MEDICINE

## 2025-03-31 PROCEDURE — 71046 X-RAY EXAM CHEST 2 VIEWS: CPT

## 2025-03-31 RX ORDER — BENZONATATE 100 MG/1
100 CAPSULE ORAL 3 TIMES DAILY PRN
Qty: 20 CAPSULE | Refills: 0 | Status: SHIPPED | OUTPATIENT
Start: 2025-03-31

## 2025-03-31 RX ORDER — PREDNISONE 20 MG/1
TABLET ORAL
Qty: 33 TABLET | Refills: 0 | Status: SHIPPED | OUTPATIENT
Start: 2025-03-31 | End: 2025-04-20

## 2025-03-31 NOTE — PROGRESS NOTES
Name: Anyi Friend      : 1980      MRN: [id number]  Encounter Provider: Delvis Langley MD  Encounter Date: 3/31/2025   Encounter department: [facility] PRIMARY CARE    Assessment & Plan  Postviral syndrome  The etiology of the cough remains uncertain, with potential causes being either a relapse of a previous condition or an allergic reaction to the current pollen season. A chest x-ray will be ordered for further evaluation. A prescription for prednisone will be provided, starting with a dosage of 60 mg (3 x 20 mg tablets) for 5 days, followed by a tapering schedule of 40 mg for 5 days, 20 mg for 5 days, and 10 mg for 5 days, culminating in a total course duration of 20 days. Additionally, Tessalon will be prescribed for symptomatic relief, to be taken as needed, particularly during the night. If the current treatment plan proves ineffective, pulmonary function tests will be considered as the next diagnostic step.  Orders:    predniSONE 20 mg tablet; Take 3 tablets (60 mg total) by mouth daily for 5 days, THEN 2 tablets (40 mg total) daily for 5 days, THEN 1 tablet (20 mg total) daily for 5 days, THEN 0.5 tablets (10 mg total) daily for 5 days.    benzonatate (TESSALON PERLES) 100 mg capsule; Take 1 capsule (100 mg total) by mouth 3 (three) times a day as needed for cough    XR chest pa and lateral; Future           History of Present Illness     History of Present Illness  The patient is a 44-year-old female who presents for evaluation of a persistent cough.    She reports a recurrence of her cough, which had previously subsided following a course of prednisone and Tessalon. The cough returned on the past Friday, accompanied by a sensation of heaviness in her chest. She was seen on 2025 and had been coughing for a month at that point. Despite the administration of prednisone 40 mg daily for 5 days, the cough persisted initially but eventually subsided. However, the cough has since  "returned. She also reports pain behind her eyes and symptoms suggestive of allergies, including nasal congestion and rhinorrhea. She has initiated self-treatment with Robitussin, which provides some relief, but the cough persists, particularly at night. She has no history of pulmonary disorders such as asthma.    MEDICATIONS  Current: prednisone, Tessalon, Robitussin     Review of Systems  Objective   /62 (BP Location: Left arm, Patient Position: Sitting, Cuff Size: Large)   Pulse 81   Temp 98.5 °F (36.9 °C) (Oral)   Ht 5' 3\" (1.6 m)   Wt 73.2 kg (161 lb 6.4 oz)   SpO2 99%   BMI 28.59 kg/m²     Physical Exam    Physical Exam  Constitutional:       General: She is not in acute distress.     Appearance: Normal appearance. She is not ill-appearing.   HENT:      Right Ear: Tympanic membrane normal.      Left Ear: Tympanic membrane normal.      Nose: Congestion present. No rhinorrhea.      Mouth/Throat:      Mouth: Mucous membranes are moist.      Pharynx: No posterior oropharyngeal erythema.   Pulmonary:      Comments: She had a single popping sound at the left base which cleared and was not audible after that.  No wheezes noted.  Neurological:      Mental Status: She is alert.         "

## 2025-04-02 ENCOUNTER — RESULTS FOLLOW-UP (OUTPATIENT)
Dept: FAMILY MEDICINE CLINIC | Facility: CLINIC | Age: 45
End: 2025-04-02

## 2025-05-12 NOTE — PROGRESS NOTES
" Nutrition Assessment Form    Patient Name: Anyi Friend    YOB: 1980    Sex: Female     Assessment Date: 5/12/2025  Start Time: 3:22 PM Stop Time: 3:58 PM Total Minutes: 36 min     Data:  Present at session: self   Parent/Patient Concerns/reason for visit: Pt gained 5lb since previous visit.    Medical Dx/Reason for Referral: Obesity   Past Medical History:   Diagnosis Date    2019 novel coronavirus disease (COVID-19) 12/28/2020    Known health problems: none     Psoriasis         3/31/25 Chest Xray: No acute cardiopulmonary disease.    Current Outpatient Medications   Medication Sig Dispense Refill    benzonatate (TESSALON PERLES) 100 mg capsule Take 1 capsule (100 mg total) by mouth 3 (three) times a day as needed for cough 20 capsule 0    omeprazole (PriLOSEC) 20 mg delayed release capsule TAKE 1 CAPSULE BY MOUTH EVERY DAY BEFORE BREAKFAST 90 capsule 1    SUMAtriptan (IMITREX) 50 mg tablet TAKE 1 TABLET BY MOUTH ONCE AS NEEDED FOR MIGRAINE FOR UP TO 1 DOSE. 9 tablet 5     No current facility-administered medications for this visit.        Additional Meds/Supplements: Women's one a day, takes iron pills occasionally, sometimes will have constipation with this, bloated from constipation    Special Learning Needs/barriers to learning/any new barriers N/a   Height: HC Readings from Last 5 Encounters:   No data found for HC      Weight: Wt Readings from Last 10 Encounters:   03/31/25 73.2 kg (161 lb 6.4 oz)   03/11/25 74 kg (163 lb 3.2 oz)   03/03/25 75.8 kg (167 lb)   11/26/24 74.8 kg (165 lb)   11/19/24 75 kg (165 lb 6.4 oz)   10/21/24 75.8 kg (167 lb)   10/08/24 75.3 kg (166 lb)   08/27/24 76.7 kg (169 lb)   08/12/24 76.8 kg (169 lb 6.4 oz)   07/25/24 77.5 kg (170 lb 12.8 oz)     Estimated body mass index is 28.59 kg/m² as calculated from the following:    Height as of 3/31/25: 5' 3\" (1.6 m).    Weight as of 3/31/25: 73.2 kg (161 lb 6.4 oz).   Recent Weight Change: [x]Yes     []No  Amount: Successful " "weight loss from initial appointment 5/30/24 175lb. Down 12lb total.     Pt has gained 5lb from previous visit on 3/11/25 163lb.       Energy Needs: [place]- [employer] Equation: 1400 kcal (mifflin x 1.3 activity minus 500 for 1 lb weight loss per day)   No Known Allergies or intolerances Doesn't care for fish apart from breaded shrimp, prefers poultry, strongly dislikes [place] sprouts    Social History     Substance and Sexual Activity   Alcohol Use Yes    Couple times per month such as at a party   Social History     Tobacco Use   Smoking Status Never    Passive exposure: Never   Smokeless Tobacco Never    None    Who shops? spouse   Who cooks/cooking methods/Eating out/take out habits   spouse or eat out  Cooking methods: bake/bermudez/air bermudez/grill/boiil--variety of methods,  follows gluten free diet and pt follows this too     Take out: At lunch for work    Dining out Most dinners will eat out   Exercise: Works as , sedentary job      Other: ie: Sleep habits/ stress level/ work habits household-lives with ?/ food security No food insecurity    5 hours sleep most nights, wakes up early at 3:30-4 AM, sleep isn't consistent   Prior Nutritional Counseling? []Yes     [x]No  When:      Why:         Diet Hx:  Breakfast: Diet: n/a  Granola bar or yogurt     Drinking DD coffee now, medium [place] vanilla swirl sweetened coffee (170 kcal each)    Or trail mix as of 5/13/25, reports watching portion size of this, 1 serving only         Lunch: [place] made at home or just yogurt if didn't have time     Or takeout salad         Dinner: Usually go to mexican restaurants like [employer] or [employer]  -chicken tacos or chicken burrito bowl with rice and beans with guacamole and chips, may share the meal depending upon appetite.    Now splitting the restaurant meal with her .      recently started making dinner meals for pt such as homemade chicken noodle soup or \"meat loaf cordon david\" with " "potatoes/green beans. 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 Pt reports her eating habits at work vary greatly, may or may not have snacks/meals there, typically relies on convenient foods from vending machine or takeout. Eats dinner out most every night during the week.     Updated assessment (Follow up note only):  Pt has successfully lost 5lb from previous appointment (~.5lb weight loss per week). Pt has been calorie tracking which she found helpful and manageable. \"I know I would lose more weight if I was exercising.\"     8/27/24: Pt lost 1lb since previous appointment. Pt feels her weight loss has hit a plateau and unsure how to lose more weight. Pt reports she has been tracking calorie intake though this is mostly a mental note, not utilizing tracking apps or pen/paper etc. ?accurately estimating calories on food items such as sandwiches/hoagies etc. Pt has not yet started walking or other additional physical activity, pt says she is too tired to do this after work.     10/8/24: Pt has lost 3lb from previous appointment. Pt reports increased stress at work, has not found time outside of work to go walking/exercising. Has not been utilizing calorie tracking apps, finds them too difficult to use, more interested in pen/paper tracking.     11/19/24: Pt with continued slight weight loss. Pt would like to see further weight loss and inquiring what she can do to see further results.     3/11/25: Pt reports has been making a mental note of calories she is taking in, aiming for " 1400 kcal per day, not writing this down currently though being mindful of calories she is eating at a given meal or snack. Pt has not yet started exercise, feels motivation is hindering her.     5/13/25: Pt unfortunately had gained 5lb from previous visit. Suspect related to increase in sugar sweetened beverages. Pt had change in job position/going to work later and now has time to stop at [employer] in the morning. Coffee she orders provides 170kcal per their restaurant's website. Pt reports she has started walking occasionally on a path near her home, admits it is not weekly yet.          Nutrition Diagnosis:   Overweight/obesity  related to Excess energy intake as evidenced by  BMI more than normative standard for age and sex (obesity-grade I 30-34.9)       Any change or new dx since previous visit:       Medical Nutrition Therapy Intervention:  [x]Individualized Meal Plan--Discussed selecting sugar free vanilla for coffee at [employer] instead of sweetened swirl which pt was not aware she could get.     Discussed use of [employer] general wellness meal plan for portioned meals to have at home given continued reliance on takeout and inconsistency in home prepared meals. Pt appeared very interested in these. Encouraged she select options < 700 mg sodium per meal for general wellness.    []Understanding Lab Values   []Basic Pathophysiology of Disease []Food/Medication Interactions   []Food Diary [x]Exercise--Congratulated pt on the increase in walking she has done! Suggested activities that include physical activity such as walking in a mall and pt enjoys doing.    []Lifestyle/Behavior Modification Techniques []Medication, Mechanism of Action   []Label Reading:  []Self Blood Glucose Monitoring   [x]Weight/BMI Goals: Goal of 0.5 - 1 lb weight loss per week. Pt has successfully transitioned form obesity class 1 category to overweight category. Goal weight of 140lb which would provide BMI 24.9/normal BMI.     Pt's  "rate of weight loss is slow. -12lb in 10 mo. Continue to encouraged physical activity to increase rate of weight loss.  [x]Other -     Handouts provided today: Moms Meals handout    Handouts previously provided: 1400 kcal sample meal plan, Nonstarchy vegetables food list, 6 Week Beginner Walking Plan, Recipes for: Roasted Veggies, Grilled Cabbage Steaks, Gyros, Quick Lunch Ideas, almond davis date candies, Recipes for homemade gyros, roasted vegetables, grilled cabbage steaks, Choose Your Foods List (for kcal content)          Comprehension: []Excellent  []Very Good  [x]Good  []Fair   []Poor    Receptivity: []Excellent  []Very Good  [x]Good  []Fair   []Poor    Expected Compliance: []Excellent  []Very Good  [x]Good  []Fair   []Poor        Goals (initial)/ Progress made on previous goals/new goals:  Pt to continue to lose 0.5 - 1 lb per week upon follow up.--not met, extended    2. Pt to limit snack options at work to 200 kcal each day upon follow up. --met, extended   3. Pt to plan walking 1-2x per week with friend upon follow up. --not met , progressing, extended       No follow-ups on file.  Labs:  CMP  Lab Results   Component Value Date    K 3.8 11/01/2018     11/01/2018    CO2 23 11/01/2018    BUN 9 11/01/2018    CREATININE 0.75 11/01/2018    GLUF 84 11/01/2018    CALCIUM 8.7 11/01/2018    AST 42 03/01/2018    ALT 82 (H) 03/01/2018    ALKPHOS 115 03/01/2018    EGFR 103 11/01/2018       BMP  Lab Results   Component Value Date    CALCIUM 8.7 11/01/2018    K 3.8 11/01/2018    CO2 23 11/01/2018     11/01/2018    BUN 9 11/01/2018    CREATININE 0.75 11/01/2018       Lipids  No results found for: \"CHOL\"  Lab Results   Component Value Date    HDL 61 12/23/2019     Lab Results   Component Value Date    LDLCALC 93 12/23/2019     Lab Results   Component Value Date    TRIG 48 12/23/2019     No results found for: \"CHOLHDL\"    Hemoglobin A1C  No results found for: \"HGBA1C\"    Fasting Glucose  Lab Results   Component " "Value Date    GLUF 84 11/01/2018       Insulin     Thyroid  No results found for: \"TSH\", \"W9DWWVM\", \"R1CEKSA\", \"THYROIDAB\"    Hepatic Function Panel  Lab Results   Component Value Date    ALT 82 (H) 03/01/2018    AST 42 03/01/2018    ALKPHOS 115 03/01/2018       Celiac Disease Antibody Panel  No results found for: \"ENDOMYSIAL IGA\", \"GLIADIN IGA\", \"GLIADIN IGG\", \"IGA\", \"TISSUE TRANSGLUT AB\", \"TTG IGA\"   Iron  Lab Results   Component Value Date    IRON 80 11/01/2018    TIBC 421 11/01/2018    FERRITIN 7 (L) 11/01/2018            Nicole Thorpe RD, LDN  [facility] CLINICAL NUTRITION SERVICES  [address] [place] [id number]    "

## 2025-05-13 ENCOUNTER — CLINICAL SUPPORT (OUTPATIENT)
Dept: NUTRITION | Facility: CLINIC | Age: 45
End: 2025-05-13
Payer: COMMERCIAL

## 2025-05-13 VITALS — BODY MASS INDEX: 29.8 KG/M2 | WEIGHT: 168.2 LBS

## 2025-05-13 DIAGNOSIS — E66.811 CLASS 1 OBESITY DUE TO EXCESS CALORIES WITHOUT SERIOUS COMORBIDITY WITH BODY MASS INDEX (BMI) OF 33.0 TO 33.9 IN ADULT: Primary | ICD-10-CM

## 2025-05-13 DIAGNOSIS — E66.09 CLASS 1 OBESITY DUE TO EXCESS CALORIES WITHOUT SERIOUS COMORBIDITY WITH BODY MASS INDEX (BMI) OF 33.0 TO 33.9 IN ADULT: Primary | ICD-10-CM

## 2025-05-13 PROCEDURE — 97803 MED NUTRITION INDIV SUBSEQ: CPT | Performed by: DIETITIAN, REGISTERED

## 2025-06-24 DIAGNOSIS — K21.9 GASTROESOPHAGEAL REFLUX DISEASE, UNSPECIFIED WHETHER ESOPHAGITIS PRESENT: ICD-10-CM

## 2025-06-24 RX ORDER — OMEPRAZOLE 20 MG/1
20 CAPSULE, DELAYED RELEASE ORAL
Qty: 90 CAPSULE | Refills: 1 | Status: SHIPPED | OUTPATIENT
Start: 2025-06-24

## 2025-07-15 ENCOUNTER — CLINICAL SUPPORT (OUTPATIENT)
Dept: NUTRITION | Facility: CLINIC | Age: 45
End: 2025-07-15
Payer: COMMERCIAL

## 2025-07-15 VITALS — BODY MASS INDEX: 29.97 KG/M2 | WEIGHT: 169.2 LBS

## 2025-07-15 DIAGNOSIS — E66.09 CLASS 1 OBESITY DUE TO EXCESS CALORIES WITHOUT SERIOUS COMORBIDITY WITH BODY MASS INDEX (BMI) OF 33.0 TO 33.9 IN ADULT: Primary | ICD-10-CM

## 2025-07-15 DIAGNOSIS — E66.811 CLASS 1 OBESITY DUE TO EXCESS CALORIES WITHOUT SERIOUS COMORBIDITY WITH BODY MASS INDEX (BMI) OF 33.0 TO 33.9 IN ADULT: Primary | ICD-10-CM

## 2025-07-15 DIAGNOSIS — E66.811 OBESITY, CLASS I, BMI 30-34.9: ICD-10-CM

## 2025-07-15 PROCEDURE — 97803 MED NUTRITION INDIV SUBSEQ: CPT | Performed by: DIETITIAN, REGISTERED
